# Patient Record
Sex: MALE | Race: WHITE | Employment: OTHER | URBAN - METROPOLITAN AREA
[De-identification: names, ages, dates, MRNs, and addresses within clinical notes are randomized per-mention and may not be internally consistent; named-entity substitution may affect disease eponyms.]

---

## 2017-02-08 ENCOUNTER — TELEPHONE (OUTPATIENT)
Dept: FAMILY MEDICINE CLINIC | Facility: CLINIC | Age: 37
End: 2017-02-08

## 2017-02-08 NOTE — TELEPHONE ENCOUNTER
Reason for Call/Chief Complaint: R ear pain and mild sore throat  Onset: 1 day  Nursing Assessment/Associated Symptoms: Pt c/o R ear pain and mild sore throat x 1 day. Pt declined appt because he does not have insurance.  Pt denies fever, dizziness, cough,

## 2017-02-08 NOTE — TELEPHONE ENCOUNTER
Pt called in stating his right ear is in a lot of pain, the symptoms started yesterday. Pt is requesting to speak to an RN, please advise.

## 2017-02-09 NOTE — TELEPHONE ENCOUNTER
Dr. Mauro Holter - please advise. Pt emphasized he does not want abx. He cannot come in for appt because he has no insurance. Please advise on treatment/management for ear pain. Pt agreeable to await your return to the office.

## 2017-02-09 NOTE — TELEPHONE ENCOUNTER
No answer; no VM option. If calls back, please transfer to [id number] after updating contact/work info; otherwise staff can try boris at another time.

## 2017-08-14 RX ORDER — FINASTERIDE 1 MG/1
TABLET, FILM COATED ORAL
Qty: 30 TABLET | Refills: 0 | Status: SHIPPED | OUTPATIENT
Start: 2017-08-14 | End: 2017-09-26

## 2017-09-26 ENCOUNTER — TELEPHONE (OUTPATIENT)
Dept: FAMILY MEDICINE CLINIC | Facility: CLINIC | Age: 37
End: 2017-09-26

## 2017-09-26 RX ORDER — FINASTERIDE 1 MG/1
1 TABLET, FILM COATED ORAL
Qty: 30 TABLET | Refills: 1 | Status: SHIPPED | OUTPATIENT
Start: 2017-09-26 | End: 2017-09-27

## 2017-09-26 RX ORDER — FINASTERIDE 1 MG/1
TABLET, FILM COATED ORAL
Qty: 30 TABLET | Refills: 0 | Status: CANCELLED | OUTPATIENT
Start: 2017-09-26

## 2017-09-26 NOTE — TELEPHONE ENCOUNTER
Message noted: Chart reviewed and may refill medication as requested times one with one refill. Prescription sent to listed pharmacy.  Please notify patient to make follow up appointment for further refills when he is able

## 2017-09-26 NOTE — TELEPHONE ENCOUNTER
Pt called for rx refill for Current Outpatient Prescriptions:  FINASTERIDE 1 MG Oral Tab TAKE 1 TABLET BY MOUTH DAILY Disp: 30 tablet Rfl: 0     Pt was told he needs an appt for a refill.  Pt denies creating an appointment due to insurance issues/ leaving o

## 2017-09-27 RX ORDER — FINASTERIDE 1 MG/1
1 TABLET, FILM COATED ORAL
Qty: 30 TABLET | Refills: 2 | Status: SHIPPED | OUTPATIENT
Start: 2017-09-27 | End: 2018-02-04

## 2017-09-27 NOTE — TELEPHONE ENCOUNTER
Spoke with patient who stated he won't be able to come in for three months because his new insurance won't kick in until then. Dr. Juliana Rivera do you want to add another refill? Please advise.       Signed Prescriptions Disp Refills    finasteride 1 MG Oral Tab 30

## 2017-09-27 NOTE — TELEPHONE ENCOUNTER
Signed Prescriptions Disp Refills    finasteride 1 MG Oral Tab 30 tablet 2      Sig: Take 1 tablet (1 mg total) by mouth once daily. Authorizing Provider: Raquel Whitaker        Ordering User: Albania Cannon           Refill approved per protocol.

## 2018-02-06 RX ORDER — FINASTERIDE 1 MG/1
TABLET, FILM COATED ORAL
Qty: 30 TABLET | Refills: 0 | Status: SHIPPED | OUTPATIENT
Start: 2018-02-06 | End: 2018-05-17

## 2018-02-06 NOTE — TELEPHONE ENCOUNTER
Pt states will callback to schedule an appt due to has a new job with new insurance and would like to know if  can refill just 1 month.

## 2018-02-06 NOTE — TELEPHONE ENCOUNTER
Dr. Shantel Bello, per last refill, pt was to have an appt. Please advise regarding refill as no OV since 2016. CSS, please call and assist pt in scheduling f/u appt. Thank you.

## 2018-02-07 ENCOUNTER — NURSE TRIAGE (OUTPATIENT)
Dept: OTHER | Age: 38
End: 2018-02-07

## 2018-02-07 NOTE — TELEPHONE ENCOUNTER
Called pt, verified name and . Advised him that Dr. Alfie Gaspar agrees with ER disposition. Pt is still reluctant. States that he wants to make sure he isn't just \"freaking out\".  However, also states that he doesn't remember anxiety causing his heart to hurt

## 2018-02-07 NOTE — TELEPHONE ENCOUNTER
Spoke to pt, he states that he tried to take a nap but the symptoms remain. Again, advised ER but pt states that he will not go to ER. Pt is more agreeable to go to  for eval. He will go to the [address] [place] [address] location.  RN to f/u in am.

## 2018-02-07 NOTE — TELEPHONE ENCOUNTER
Action Requested: Summary for Provider     []  Critical Lab, Recommendations Needed  [] Need Additional Advice  []   FYI    []   Need Orders  [] Need Medications Sent to Pharmacy  []  Other     SUMMARY:   I strongly suggested the ED  Patient is refusing.  H has been increasing in severity or frequency    Protocols used: CHEST PAIN-A-OH

## 2018-02-08 NOTE — TELEPHONE ENCOUNTER
Called patient - verified patient's name and  - pt states he still hasn't gone to ER/UC. States he thinks it might be anxiety but that his heart \"hurts\" which is a symptom he has not had before and also pain radiating to his left armpit.  Strongly advi

## 2018-02-09 NOTE — TELEPHONE ENCOUNTER
No ER/IC visit noted in pt's EMR. Called pt for f/u, unable to leave VM message as mailbox is full. Will try again later.

## 2018-02-10 ENCOUNTER — APPOINTMENT (OUTPATIENT)
Dept: GENERAL RADIOLOGY | Facility: HOSPITAL | Age: 38
End: 2018-02-10
Payer: COMMERCIAL

## 2018-02-10 ENCOUNTER — HOSPITAL ENCOUNTER (EMERGENCY)
Facility: HOSPITAL | Age: 38
Discharge: HOME | End: 2018-02-10
Payer: COMMERCIAL

## 2018-02-10 VITALS
DIASTOLIC BLOOD PRESSURE: 75 MMHG | RESPIRATION RATE: 18 BRPM | OXYGEN SATURATION: 99 % | TEMPERATURE: 98 F | SYSTOLIC BLOOD PRESSURE: 119 MMHG | HEART RATE: 77 BPM

## 2018-02-10 DIAGNOSIS — R07.9 CHEST PAIN OF UNCERTAIN ETIOLOGY: Primary | ICD-10-CM

## 2018-02-10 LAB
ANION GAP SERPL CALC-SCNC: 9 MMOL/L (ref 0–18)
BASOPHILS # BLD: 0 K/UL (ref 0–0.2)
BASOPHILS NFR BLD: 1 %
BUN SERPL-MCNC: 17 MG/DL (ref 8–20)
BUN/CREAT SERPL: 16.3 (ref 10–20)
CALCIUM SERPL-MCNC: 9.2 MG/DL (ref 8.5–10.5)
CHLORIDE SERPL-SCNC: 104 MMOL/L (ref 95–110)
CO2 SERPL-SCNC: 24 MMOL/L (ref 22–32)
CREAT SERPL-MCNC: 1.04 MG/DL (ref 0.5–1.5)
D DIMER PPP FEU-MCNC: <0.27 MCG/ML (ref ?–0.5)
EOSINOPHIL # BLD: 0.1 K/UL (ref 0–0.7)
EOSINOPHIL NFR BLD: 1 %
ERYTHROCYTE [DISTWIDTH] IN BLOOD BY AUTOMATED COUNT: 12.7 % (ref 11–15)
GLUCOSE SERPL-MCNC: 142 MG/DL (ref 70–99)
HCT VFR BLD AUTO: 46.4 % (ref 41–52)
HGB BLD-MCNC: 16 G/DL (ref 13.5–17.5)
LYMPHOCYTES # BLD: 1.7 K/UL (ref 1–4)
LYMPHOCYTES NFR BLD: 22 %
MAGNESIUM SERPL-MCNC: 1.9 MG/DL (ref 1.8–2.5)
MCH RBC QN AUTO: 30.7 PG (ref 27–32)
MCHC RBC AUTO-ENTMCNC: 34.5 G/DL (ref 32–37)
MCV RBC AUTO: 89 FL (ref 80–100)
MONOCYTES # BLD: 0.7 K/UL (ref 0–1)
MONOCYTES NFR BLD: 10 %
NEUTROPHILS # BLD AUTO: 5.3 K/UL (ref 1.8–7.7)
NEUTROPHILS NFR BLD: 67 %
OSMOLALITY UR CALC.SUM OF ELEC: 288 MOSM/KG (ref 275–295)
PLATELET # BLD AUTO: 199 K/UL (ref 140–400)
PMV BLD AUTO: 9.8 FL (ref 7.4–10.3)
POTASSIUM SERPL-SCNC: 3.6 MMOL/L (ref 3.3–5.1)
RBC # BLD AUTO: 5.21 M/UL (ref 4.5–5.9)
SODIUM SERPL-SCNC: 137 MMOL/L (ref 136–144)
TROPONIN I SERPL-MCNC: 0 NG/ML (ref ?–0.03)
TROPONIN I SERPL-MCNC: 0 NG/ML (ref ?–0.03)
WBC # BLD AUTO: 7.8 K/UL (ref 4–11)

## 2018-02-10 PROCEDURE — 84484 ASSAY OF TROPONIN QUANT: CPT | Performed by: EMERGENCY MEDICINE

## 2018-02-10 PROCEDURE — 83735 ASSAY OF MAGNESIUM: CPT | Performed by: EMERGENCY MEDICINE

## 2018-02-10 PROCEDURE — 93010 ELECTROCARDIOGRAM REPORT: CPT | Performed by: EMERGENCY MEDICINE

## 2018-02-10 PROCEDURE — 36415 COLL VENOUS BLD VENIPUNCTURE: CPT

## 2018-02-10 PROCEDURE — 71046 X-RAY EXAM CHEST 2 VIEWS: CPT

## 2018-02-10 PROCEDURE — 84484 ASSAY OF TROPONIN QUANT: CPT

## 2018-02-10 PROCEDURE — 80048 BASIC METABOLIC PNL TOTAL CA: CPT

## 2018-02-10 PROCEDURE — 85379 FIBRIN DEGRADATION QUANT: CPT | Performed by: EMERGENCY MEDICINE

## 2018-02-10 PROCEDURE — 93010 ELECTROCARDIOGRAM REPORT: CPT | Performed by: INTERNAL MEDICINE

## 2018-02-10 PROCEDURE — 85025 COMPLETE CBC W/AUTO DIFF WBC: CPT

## 2018-02-10 PROCEDURE — 99285 EMERGENCY DEPT VISIT HI MDM: CPT

## 2018-02-10 PROCEDURE — 93005 ELECTROCARDIOGRAM TRACING: CPT

## 2018-02-10 RX ORDER — CLONAZEPAM 0.5 MG/1
0.5 TABLET ORAL 2 TIMES DAILY PRN
Qty: 4 TABLET | Refills: 0 | Status: SHIPPED | OUTPATIENT
Start: 2018-02-10 | End: 2018-02-14

## 2018-02-10 NOTE — TELEPHONE ENCOUNTER
Pt has not checked into ER, attempted to reach pt, no answer. Unable to leave VM as mailbox is still full. OLAMIDE Ladd.

## 2018-02-10 NOTE — TELEPHONE ENCOUNTER
Spoke with pt who states he is still is having intermittent chest discomfort that radiates to his \"heart\"  Denies arm pain, nausea, difficulty breathing.   Pt states he did not want to go to the hospital since he thought it was related to his anxiety and

## 2018-02-11 NOTE — ED NOTES
PT c/o increased nausea and feeling unwell. Dr. Jake Suggs notified. Pt remains on cardiac and spo2 monitor.

## 2018-02-12 NOTE — TELEPHONE ENCOUNTER
Attempt made to contact patient at mobile number; no answer mailbox is full. Attempt made to contact patient at work number spoke with someone who states phone number is a car [employer].

## 2018-02-12 NOTE — TELEPHONE ENCOUNTER
Patient went to [address] [place] [address] ER on 2/10/18 indicated that everything came back normal. Could not find a reason for chest pain. Patient was given clonazepam for anxiety though. Patient still having chest pain and shortness of breath on and off.  Took clonazepam this

## 2018-02-14 ENCOUNTER — OFFICE VISIT (OUTPATIENT)
Dept: FAMILY MEDICINE CLINIC | Facility: CLINIC | Age: 38
End: 2018-02-14

## 2018-02-14 VITALS
HEIGHT: 69 IN | BODY MASS INDEX: 23.7 KG/M2 | SYSTOLIC BLOOD PRESSURE: 109 MMHG | WEIGHT: 160 LBS | DIASTOLIC BLOOD PRESSURE: 69 MMHG | HEART RATE: 99 BPM

## 2018-02-14 DIAGNOSIS — F41.9 ANXIETY: ICD-10-CM

## 2018-02-14 DIAGNOSIS — R07.89 ATYPICAL CHEST PAIN: ICD-10-CM

## 2018-02-14 PROCEDURE — 99213 OFFICE O/P EST LOW 20 MIN: CPT | Performed by: FAMILY MEDICINE

## 2018-02-14 PROCEDURE — 99212 OFFICE O/P EST SF 10 MIN: CPT | Performed by: FAMILY MEDICINE

## 2018-02-14 RX ORDER — CLONAZEPAM 0.5 MG/1
0.5 TABLET ORAL 2 TIMES DAILY PRN
Qty: 60 TABLET | Refills: 2 | Status: SHIPPED | OUTPATIENT
Start: 2018-02-14 | End: 2018-12-16

## 2018-02-14 NOTE — PROGRESS NOTES
HPI:    Patient ID: Juliette Abbasi is a 40year old male. Pt presents for follow up from the ER for chest pains. Was diagnosed with atypical chest pains. Work up in the ER was unremarkable.  Patient is being treated with clonazepam and states symptoms are Types: Cigarettes  Smokeless tobacco: Never Used                      Alcohol use: No                  Review of Systems     Positive for stated complaint:   Other systems are as noted in HPI.   Constitutional and vital signs reviewed.       All other syste components within normal limits  TROPONIN I - Normal  MAGNESIUM - Normal  D-DIMER - Normal  TROPONIN I - Normal  CBC WITH DIFFERENTIAL WITH PLATELET    Narrative:     The following orders were created for panel order CBC WITH DIFFERENTIAL WITH PLATELET.   P Discharge Medication List     START taking these medications     ClonazePAM 0.5 MG Oral Tab  Take 1 tablet (0.5 mg total) by mouth 2 (two) times daily as needed for Anxiety.   Qty: 4 tablet Refills: 0                   Review of Systems   Respiratory: Negat

## 2018-04-01 ENCOUNTER — TELEPHONE (OUTPATIENT)
Dept: FAMILY MEDICINE CLINIC | Facility: CLINIC | Age: 38
End: 2018-04-01

## 2018-04-01 NOTE — TELEPHONE ENCOUNTER
On call note: Was called on 4/1/18 by patient as he has sun burn on the chest. Pt is out of town. Was calling to see if there is anything that can be called in for his sun burn. Pt denies any drainage or fevers. Pt has been using otc products.  Discussed si

## 2018-05-17 RX ORDER — FINASTERIDE 1 MG/1
TABLET, FILM COATED ORAL
Qty: 30 TABLET | Refills: 0 | Status: SHIPPED | OUTPATIENT
Start: 2018-05-17 | End: 2018-06-23

## 2018-05-17 NOTE — TELEPHONE ENCOUNTER
Message noted: Chart reviewed and may refill medication as requested times one as requested. Prescription sent to listed pharmacy. Pharmacy to notify patient.

## 2018-05-17 NOTE — TELEPHONE ENCOUNTER
LOV: 2/14/18 LasT Rx: 2/6/18    No protocol     Please advise in regards to refill request. Thank You

## 2018-06-23 ENCOUNTER — TELEPHONE (OUTPATIENT)
Dept: FAMILY MEDICINE CLINIC | Facility: CLINIC | Age: 38
End: 2018-06-23

## 2018-06-24 NOTE — TELEPHONE ENCOUNTER
LOV: 2-14-18 Last Rx: 5-17-18     No protocol     Please advise in regards to refill request. Thank You

## 2018-06-25 RX ORDER — FINASTERIDE 1 MG/1
TABLET, FILM COATED ORAL
Qty: 30 TABLET | Refills: 5 | Status: SHIPPED | OUTPATIENT
Start: 2018-06-25 | End: 2019-01-18

## 2018-06-26 NOTE — TELEPHONE ENCOUNTER
Pt called in very upset that he \"gets the run around\" for a medication he's been on for 10 years. Pt states he doesn't think it's right to limit the number of refills. Expressed being upset with the process.  Pt was informed that the script was sent over

## 2018-07-24 ENCOUNTER — NURSE TRIAGE (OUTPATIENT)
Dept: OTHER | Age: 38
End: 2018-07-24

## 2018-07-24 NOTE — TELEPHONE ENCOUNTER
Chart reviewed again and do not see any patch in medication list that I or another provider prescribed for him in the last several years. What was the name of patch?

## 2018-07-24 NOTE — TELEPHONE ENCOUNTER
Chart reviewed. Do not see past visit for back pain or past prescription for any patch. I recommend he be evaluated today at immediate care.

## 2018-07-24 NOTE — TELEPHONE ENCOUNTER
Action Requested: Summary for Provider     []  Critical Lab, Recommendations Needed  [] Need Additional Advice  []   FYI    []   Need Orders  [x] Need Medications Sent to Pharmacy  []  Other     SUMMARY: Hillary Tadeo for Ramses Geronimo pt called requesting  a patch to

## 2018-07-24 NOTE — TELEPHONE ENCOUNTER
NP=please see message below, aaking for the patch that you rx him few years ago, will wait until you comes back in the office for response.     Spoke with patient (identified name and ) ,advised Cat Avila's note and very frustrated about not giv

## 2018-07-25 NOTE — TELEPHONE ENCOUNTER
Tried calling pt, call goes directly to vm option, but mailbox is full. Unable to leave message. Try later. Thanks.

## 2018-07-25 NOTE — TELEPHONE ENCOUNTER
Advised patient of Dr. Em Garsia note. Patient verbalized understanding. Patient indicated that patch was prescribed by ER, but did not state the name of the patch. Wanted to see Dr Jonny Dyer tomorrow. Appointment made for tomorrow at 2:20pm with Dr Jonny Dyer at [facility].

## 2018-07-26 ENCOUNTER — OFFICE VISIT (OUTPATIENT)
Dept: FAMILY MEDICINE CLINIC | Facility: CLINIC | Age: 38
End: 2018-07-26
Payer: COMMERCIAL

## 2018-07-26 ENCOUNTER — TELEPHONE (OUTPATIENT)
Dept: OTHER | Age: 38
End: 2018-07-26

## 2018-07-26 VITALS
HEIGHT: 69 IN | DIASTOLIC BLOOD PRESSURE: 61 MMHG | BODY MASS INDEX: 22.81 KG/M2 | RESPIRATION RATE: 18 BRPM | WEIGHT: 154 LBS | TEMPERATURE: 98 F | HEART RATE: 84 BPM | SYSTOLIC BLOOD PRESSURE: 95 MMHG

## 2018-07-26 DIAGNOSIS — M54.9 DORSALGIA: Primary | ICD-10-CM

## 2018-07-26 DIAGNOSIS — F41.9 ANXIETY: ICD-10-CM

## 2018-07-26 PROCEDURE — 99213 OFFICE O/P EST LOW 20 MIN: CPT | Performed by: FAMILY MEDICINE

## 2018-07-26 PROCEDURE — 99212 OFFICE O/P EST SF 10 MIN: CPT | Performed by: FAMILY MEDICINE

## 2018-07-26 RX ORDER — LIDOCAINE 50 MG/G
1 PATCH TOPICAL EVERY 24 HOURS
Qty: 15 PATCH | Refills: 0 | Status: SHIPPED | OUTPATIENT
Start: 2018-07-26 | End: 2021-04-08

## 2018-07-26 NOTE — TELEPHONE ENCOUNTER
Pt states he cannot wait 72 hours for the prior auth for the lidocaine patches to go through and would prefer to just get oral medication for pain. Pt expressed frustration at what he sees is a lack of timely response on our part.  Pt also states that he

## 2018-07-26 NOTE — TELEPHONE ENCOUNTER
Patient indicated that lidocaine patches are requiring a prior authorization. States that can not take pills because of his GERD. Wanted to know if could get a shot for pain? Please advise.

## 2018-07-26 NOTE — TELEPHONE ENCOUNTER
Contacted [employer] spoke with rep Lira to initiate PA for Lidocaine 5% patch. PA has been marked urgent response up to 24 hours.

## 2018-07-26 NOTE — TELEPHONE ENCOUNTER
I do not do epidural shots for back pains. Would need to see back specialist/ physiatrist. Can give # for Dr Hansen Evelia office [phone].  Will place a referral

## 2018-07-26 NOTE — PROGRESS NOTES
HPI:    Patient ID: Chaya Gatica is a 40year old male. Pt presents with some left sided back pains over the 3 days. Pt denies any injury or trauma. Pt had similar symptoms after a fall several years ago.  Pt states he prefers a pain patch due to issues w patch onto the skin daily.            Imaging & Referrals:  CHIROPRACTIC  - INTERNAL  OP REFERRAL TO PSYCHIATRY       LN#[id number]

## 2018-07-26 NOTE — TELEPHONE ENCOUNTER
Dr NP=OLAMIDE!!, patient declines physiatrist but only wants PA for the lidocaine.     Spoke with patient, advised Dr Deniz Nunn note, in the middle of giving the name and phone number of Dr Kiel Erwin,  states that he is not interested with the shot, but asking for the

## 2018-07-27 ENCOUNTER — TELEPHONE (OUTPATIENT)
Dept: FAMILY MEDICINE CLINIC | Facility: CLINIC | Age: 38
End: 2018-07-27

## 2018-07-27 NOTE — TELEPHONE ENCOUNTER
Attempted to call pt to inform him that Dr Andrei Blakely has not replied to his request - call went directly to  and mailbox was full, unable to leave msg

## 2018-07-27 NOTE — TELEPHONE ENCOUNTER
On call patient complaining because a \"grown ass man can't get his medication so could you prescribe some pain medication I waited til you doctors are done with your parties and I understand that doctors make money off prescribing medications and all the

## 2018-07-27 NOTE — TELEPHONE ENCOUNTER
See other encounter from today- patient was able to get lidocaine 4% from pharmacy. He should schedule an appointment with Dr Elvis Lopez for follow up. He noted would only like to speak with Dr. Elvis Lopez.

## 2018-07-27 NOTE — TELEPHONE ENCOUNTER
Pt states he would like to speak with with Dr. Jaimee Wayne about his experience he has had with getting the meds below and Pt states got lidocaine 4% at [employer] after 3-4 days of trying to get the meds below.      •  lidocaine 5 % External Patch, Place 1 patch

## 2018-07-27 NOTE — TELEPHONE ENCOUNTER
PA denied. Plan states patient must have pain due to post herpetic neuralgia, pain due to diabetic neuropathy or neuropathic pain due to cancer.

## 2018-07-28 ENCOUNTER — HOSPITAL ENCOUNTER (EMERGENCY)
Facility: HOSPITAL | Age: 38
Discharge: HOME | End: 2018-07-28
Attending: EMERGENCY MEDICINE

## 2018-07-28 VITALS
TEMPERATURE: 98 F | WEIGHT: 155 LBS | HEIGHT: 66 IN | OXYGEN SATURATION: 98 % | DIASTOLIC BLOOD PRESSURE: 80 MMHG | SYSTOLIC BLOOD PRESSURE: 113 MMHG | HEART RATE: 91 BPM | RESPIRATION RATE: 20 BRPM | BODY MASS INDEX: 24.91 KG/M2

## 2018-07-28 DIAGNOSIS — Z53.21 PATIENT LEFT WITHOUT BEING SEEN: Primary | ICD-10-CM

## 2018-07-28 NOTE — ED INITIAL ASSESSMENT (HPI)
Pt states Monday he felt \"a really bad episode of back pain. \" Pt states pain to left lower back and mid shoulder blades. Pt states he feels spasms. Pt using Lidocaine patches with no relied.  Pt states he has has had this pain in the past. Denies bladder/

## 2018-07-28 NOTE — TELEPHONE ENCOUNTER
Dr Arabella Tanner spoke with pt this morning. See 7/27/18 encounter. Pt is currently in ER per MD recommendations.       Nurses to f/u 7/30/18

## 2018-07-28 NOTE — TELEPHONE ENCOUNTER
Pt contacted and having no pain relief with patches and feels like something is out of place. After discussion with patient and cousin who is there to go to ER for further evaluation. Patient verbalized understanding of recommendations and agrees to plan.

## 2018-07-28 NOTE — TELEPHONE ENCOUNTER
Message noted; PA denied. Can continue otc patches for pain and follow up if not better with us or chiropractor as discussed.

## 2018-08-06 ENCOUNTER — TELEPHONE (OUTPATIENT)
Dept: OTHER | Age: 38
End: 2018-08-06

## 2018-08-06 RX ORDER — ACETAMINOPHEN AND CODEINE PHOSPHATE 300; 30 MG/1; MG/1
1 TABLET ORAL EVERY 6 HOURS PRN
Qty: 30 TABLET | Refills: 0 | Status: SHIPPED
Start: 2018-08-06 | End: 2019-01-09 | Stop reason: ALTCHOICE

## 2018-08-06 NOTE — TELEPHONE ENCOUNTER
Reviewed Dr Jeny Cardona orders with pt and given Dr Morgan Keys tel#[phone] to schedule appt. Pt verbalized understanding and agreed with md plan.

## 2018-08-06 NOTE — TELEPHONE ENCOUNTER
Went to [facility] ER for back spasms 8/3/18 found kidney stones. Did not take tramadol as makes him vomit. Has compressed disc. Given T#3 at ER given 4-5 tabs. Only takes 1/2 tab at a time.   Has 1/2 tab left and requesting T#3 refill  Using lidocaine pa

## 2018-08-06 NOTE — TELEPHONE ENCOUNTER
Message noted. May refill tylenol #3 as requested. Erx signed and will have staff here fax to listed pharmacy. To follow up for appointment if not better or can follow up with urologist for stones. Can give # for urologist- Dr Rj Dong.  Please notify patient

## 2018-12-12 ENCOUNTER — TELEPHONE (OUTPATIENT)
Dept: FAMILY MEDICINE CLINIC | Facility: CLINIC | Age: 38
End: 2018-12-12

## 2018-12-12 NOTE — TELEPHONE ENCOUNTER
Pt states NHP had prescribed clonazepam and states believes they are --states bottle is faded and states \"2018. \" States only takes PRN.  Asked pt if he called the pharmacy and was informed he has no more refills--considering the 18 Rx includ

## 2018-12-17 ENCOUNTER — TELEPHONE (OUTPATIENT)
Dept: OTHER | Age: 38
End: 2018-12-17

## 2018-12-17 DIAGNOSIS — L50.9 URTICARIA: Primary | ICD-10-CM

## 2018-12-17 RX ORDER — CLONAZEPAM 0.5 MG/1
TABLET ORAL
Qty: 60 TABLET | Refills: 0 | Status: SHIPPED
Start: 2018-12-17 | End: 2019-03-05

## 2018-12-17 NOTE — TELEPHONE ENCOUNTER
No Protocol on this med.      Requested Prescriptions     Pending Prescriptions Disp Refills   • CLONAZEPAM 0.5 MG Oral Tab [Pharmacy Med Name: CLONAZEPAM 0.5MG TABLETS] 60 tablet 0     Sig: TAKE 1 TABLET BY MOUTH 2 TIMES A DAY AS NEEDED FOR ANXIETY       L

## 2018-12-17 NOTE — TELEPHONE ENCOUNTER
While speaking to patient RE: Clonazepam refill, patient requesting NP \"to order blood tests to find out what I'm allergic to--every time I eat bread from [employer]-or any bread--I break out with welts on my face.  I also have itching and burning in my armpit

## 2018-12-17 NOTE — TELEPHONE ENCOUNTER
Referral request noted for allergy department. Referral approved, signed and sent to [facility].  Can give information for Dr Ralph Wadsworth

## 2018-12-17 NOTE — TELEPHONE ENCOUNTER
Spoke with patient and relayed NP message below--patient verbalizes understanding and agreement. Please see 12/17 referral request for further questions/concerns.     Office staff, please fax Rx as per NP

## 2019-01-08 ENCOUNTER — NURSE TRIAGE (OUTPATIENT)
Dept: OTHER | Age: 39
End: 2019-01-08

## 2019-01-08 NOTE — TELEPHONE ENCOUNTER
Relayed  message, stated he has an appt with GI, asking for medication-rleyed  message about prilosec-asked for Appt-offered but not his desirable times-stated he will wait

## 2019-01-08 NOTE — TELEPHONE ENCOUNTER
Can give # for [phone]. Can try otc prilosec for his stomach or other antacids for symptoms.  Follow up as planned with GI.

## 2019-01-08 NOTE — H&P
[address] [facility] [address] Gastroenterology                                                                                                  Clinic History and Physical     Pa today in office #151 has been consistent since July 2018 though it is a 10 pound decrease from February 2018/2016. The patient's wife reports that he \"self medicates with cannabis\" and is under a great deal of stress.   She states he eats fast food reg Cigarettes      Smokeless tobacco: Never Used    Alcohol use: No      Alcohol/week: 0.0 oz    Drug use: No       Medications (Active prior to today's visit):    Current Outpatient Medications:  Pantoprazole Sodium 40 MG Oral Tab EC Take 1 tablet (40 mg tot palpated  : no CVA tenderness  Skin: dry, warm, no jaundice  Ext: no cyanosis, clubbing or edema is evident.    Neuro: Alert and oriented x4, and patient is having movements of all 4 extremities   Psych: Pt has a normal mood and affect, behavior is normal Aisha-Parrish tear, or malignancy. Described symptoms are similar to his prior evaluation with Dr. Jerilyn Enamorado in 2006 as above though hematemesis is a new symptom. Given this, I have restarted patient on Protonix 40 mg daily preemptively.   If significant anemi colonoscopy with intervention [i.e. polypectomy, stent placement, etc.] as indicated.     EGD consent: I have discussed the risks (including risk of delayed/missed diagnosis), benefits, and alternatives to upper endoscopy/enteroscopy with the patient [who d

## 2019-01-08 NOTE — TELEPHONE ENCOUNTER
.Action Requested: Summary for Provider     []  Critical Lab, Recommendations Needed  [] Need Additional Advice  []   FYI    []   Need Orders  [] Need Medications Sent to Pharmacy  []  Other     SUMMARY:    Patient refuses the ED;   Would like Dr. Murray stahl hours    Protocols used: ABDOMINAL PAIN - MALE-A-OH

## 2019-01-09 ENCOUNTER — OFFICE VISIT (OUTPATIENT)
Dept: ALLERGY | Facility: CLINIC | Age: 39
End: 2019-01-09
Payer: COMMERCIAL

## 2019-01-09 ENCOUNTER — NURSE ONLY (OUTPATIENT)
Dept: ALLERGY | Facility: CLINIC | Age: 39
End: 2019-01-09
Payer: COMMERCIAL

## 2019-01-09 VITALS
HEIGHT: 69 IN | OXYGEN SATURATION: 98 % | HEART RATE: 102 BPM | BODY MASS INDEX: 22.22 KG/M2 | RESPIRATION RATE: 14 BRPM | WEIGHT: 150 LBS | SYSTOLIC BLOOD PRESSURE: 124 MMHG | DIASTOLIC BLOOD PRESSURE: 79 MMHG | TEMPERATURE: 98 F

## 2019-01-09 DIAGNOSIS — L30.9 DERMATITIS: Primary | ICD-10-CM

## 2019-01-09 DIAGNOSIS — Z91.018 FOOD ALLERGY: ICD-10-CM

## 2019-01-09 DIAGNOSIS — T78.1XXA ADVERSE FOOD REACTION, INITIAL ENCOUNTER: ICD-10-CM

## 2019-01-09 PROCEDURE — 95004 PERQ TESTS W/ALRGNC XTRCS: CPT | Performed by: ALLERGY & IMMUNOLOGY

## 2019-01-09 PROCEDURE — 99212 OFFICE O/P EST SF 10 MIN: CPT | Performed by: ALLERGY & IMMUNOLOGY

## 2019-01-09 PROCEDURE — 99244 OFF/OP CNSLTJ NEW/EST MOD 40: CPT | Performed by: ALLERGY & IMMUNOLOGY

## 2019-01-09 NOTE — PROGRESS NOTES
Torrey Avalos is a 41year old male. HPI:   Patient presents with:  Rash Skin Problem (integumentary): Skin burning with antipersperent. Rash Skin Problem (integumentary): Painful welts when he eats [employer] bread, and other certain breads.      Patient is External Patch Place 1 patch onto the skin daily.  (Patient taking differently: Place 1 patch onto the skin daily as needed.  ) Disp: 15 patch Rfl: 0   FINASTERIDE 1 MG Oral Tab TAKE 1 TABLET BY MOUTH ONCE DAILY Disp: 30 tablet Rfl: 5       Allergies:  No K time, place, person & situation       ASSESSMENT/PLAN:   Assessment   Dermatitis  (primary encounter diagnosis)  Food allergy  Adverse food reaction, initial encounter    Patient is a 41-year-old male with 2-year history of reported facial dermatitis that

## 2019-01-09 NOTE — PATIENT INSTRUCTIONS
Recs:  Check celiac panel and total IgA  Check CBC and CMP  Consider dermatology evaluation of facial rash that is pimple-like are pustule-like continues in the future as there may be an underlying acne component  Patient to be seen by GI in the near futur

## 2019-01-17 ENCOUNTER — TELEPHONE (OUTPATIENT)
Dept: GASTROENTEROLOGY | Facility: CLINIC | Age: 39
End: 2019-01-17

## 2019-01-17 ENCOUNTER — OFFICE VISIT (OUTPATIENT)
Dept: GASTROENTEROLOGY | Facility: CLINIC | Age: 39
End: 2019-01-17
Payer: COMMERCIAL

## 2019-01-17 VITALS
HEART RATE: 99 BPM | BODY MASS INDEX: 22.46 KG/M2 | DIASTOLIC BLOOD PRESSURE: 66 MMHG | HEIGHT: 69 IN | SYSTOLIC BLOOD PRESSURE: 102 MMHG | WEIGHT: 151.63 LBS

## 2019-01-17 DIAGNOSIS — K92.0 HEMATEMESIS, PRESENCE OF NAUSEA NOT SPECIFIED: ICD-10-CM

## 2019-01-17 DIAGNOSIS — R19.8 IRREGULAR BOWEL HABITS: Primary | ICD-10-CM

## 2019-01-17 DIAGNOSIS — K21.9 GASTROESOPHAGEAL REFLUX DISEASE, ESOPHAGITIS PRESENCE NOT SPECIFIED: ICD-10-CM

## 2019-01-17 DIAGNOSIS — R10.13 EPIGASTRIC PAIN: ICD-10-CM

## 2019-01-17 DIAGNOSIS — K92.0 HEMATEMESIS WITH NAUSEA: ICD-10-CM

## 2019-01-17 PROCEDURE — 99212 OFFICE O/P EST SF 10 MIN: CPT | Performed by: NURSE PRACTITIONER

## 2019-01-17 PROCEDURE — 99244 OFF/OP CNSLTJ NEW/EST MOD 40: CPT | Performed by: NURSE PRACTITIONER

## 2019-01-17 RX ORDER — PANTOPRAZOLE SODIUM 40 MG/1
40 TABLET, DELAYED RELEASE ORAL
Qty: 30 TABLET | Refills: 5 | Status: SHIPPED | OUTPATIENT
Start: 2019-01-17 | End: 2019-02-16

## 2019-01-17 NOTE — PATIENT INSTRUCTIONS
-Schedule colonoscopy and EGD w/ possible biopsy w/Dr. Benavides Public w/ MAC  -Prep: Split dose Colyte or equivalent  -Anti-platelets and anti-coagulants: None  -Diabetes meds: None    ** If MAC @ EM/NE:    - HOLD ACE/ARBs the night before and/or the day of the pro

## 2019-01-17 NOTE — TELEPHONE ENCOUNTER
Scheduled for:  Colonoscopy [address] [facility]  Provider Name: Dr Ploly Casas  Date:  Yadiel Renee 2/05/19  Location:  [facility]  Sedation:  MAC  Time:  2:30 pm  Prep: split dose colyte  Meds/Allergies Reconciled?:    Diagnosis with codes:  Irregular bowel habits R19.4,8, hematem

## 2019-01-19 RX ORDER — FINASTERIDE 1 MG/1
TABLET, FILM COATED ORAL
Qty: 30 TABLET | Refills: 5 | Status: SHIPPED | OUTPATIENT
Start: 2019-01-19 | End: 2019-08-03

## 2019-01-19 NOTE — TELEPHONE ENCOUNTER
No Protocol on this med.      Requested Prescriptions     Pending Prescriptions Disp Refills   • FINASTERIDE 1 MG Oral Tab [Pharmacy Med Name: FINASTERIDE 1MG TABLETS] 30 tablet 0     Sig: TAKE 1 TABLET BY MOUTH ONCE DAILY       Last Office Visit with PCP:

## 2019-01-25 ENCOUNTER — LAB ENCOUNTER (OUTPATIENT)
Dept: LAB | Facility: HOSPITAL | Age: 39
End: 2019-01-25
Attending: ALLERGY & IMMUNOLOGY
Payer: COMMERCIAL

## 2019-01-25 DIAGNOSIS — Z91.018 FOOD ALLERGY: ICD-10-CM

## 2019-01-25 DIAGNOSIS — L30.9 DERMATITIS: ICD-10-CM

## 2019-01-25 DIAGNOSIS — R19.8 IRREGULAR BOWEL HABITS: ICD-10-CM

## 2019-01-25 DIAGNOSIS — T78.1XXA ADVERSE FOOD REACTION, INITIAL ENCOUNTER: ICD-10-CM

## 2019-01-25 LAB
ALBUMIN SERPL BCP-MCNC: 4.4 G/DL (ref 3.5–4.8)
ALBUMIN/GLOB SERPL: 1.7 {RATIO} (ref 1–2)
ALP SERPL-CCNC: 62 U/L (ref 32–100)
ALT SERPL-CCNC: 38 U/L (ref 17–63)
ANION GAP SERPL CALC-SCNC: 13 MMOL/L (ref 0–18)
AST SERPL-CCNC: 19 U/L (ref 15–41)
BASOPHILS # BLD: 0 K/UL (ref 0–0.2)
BASOPHILS NFR BLD: 1 %
BILIRUB SERPL-MCNC: 0.9 MG/DL (ref 0.3–1.2)
BUN SERPL-MCNC: 12 MG/DL (ref 8–20)
BUN/CREAT SERPL: 10.6 (ref 10–20)
CALCIUM SERPL-MCNC: 9.7 MG/DL (ref 8.5–10.5)
CHLORIDE SERPL-SCNC: 102 MMOL/L (ref 95–110)
CO2 SERPL-SCNC: 25 MMOL/L (ref 22–32)
CREAT SERPL-MCNC: 1.13 MG/DL (ref 0.5–1.5)
CRP SERPL-MCNC: <0.5 MG/DL (ref 0–0.9)
EOSINOPHIL # BLD: 0 K/UL (ref 0–0.7)
EOSINOPHIL NFR BLD: 1 %
ERYTHROCYTE [DISTWIDTH] IN BLOOD BY AUTOMATED COUNT: 13 % (ref 11–15)
ERYTHROCYTE [SEDIMENTATION RATE] IN BLOOD: 6 MM/HR (ref 0–15)
GLOBULIN PLAS-MCNC: 2.6 G/DL (ref 2.5–3.7)
GLUCOSE SERPL-MCNC: 110 MG/DL (ref 70–99)
HCT VFR BLD AUTO: 48.9 % (ref 41–52)
HGB BLD-MCNC: 16.5 G/DL (ref 13.5–17.5)
IGA SERPL-MCNC: 211 MG/DL (ref 68–378)
LYMPHOCYTES # BLD: 1.3 K/UL (ref 1–4)
LYMPHOCYTES NFR BLD: 18 %
MCH RBC QN AUTO: 30.4 PG (ref 27–32)
MCHC RBC AUTO-ENTMCNC: 33.6 G/DL (ref 32–37)
MCV RBC AUTO: 90.5 FL (ref 80–100)
MONOCYTES # BLD: 0.7 K/UL (ref 0–1)
MONOCYTES NFR BLD: 9 %
NEUTROPHILS # BLD AUTO: 5.1 K/UL (ref 1.8–7.7)
NEUTROPHILS NFR BLD: 72 %
OSMOLALITY UR CALC.SUM OF ELEC: 290 MOSM/KG (ref 275–295)
PATIENT FASTING: NO
PLATELET # BLD AUTO: 204 K/UL (ref 140–400)
PMV BLD AUTO: 9.7 FL (ref 7.4–10.3)
POTASSIUM SERPL-SCNC: 4.2 MMOL/L (ref 3.3–5.1)
PROT SERPL-MCNC: 7 G/DL (ref 5.9–8.4)
RBC # BLD AUTO: 5.41 M/UL (ref 4.5–5.9)
SODIUM SERPL-SCNC: 140 MMOL/L (ref 136–144)
WBC # BLD AUTO: 7.2 K/UL (ref 4–11)

## 2019-01-25 PROCEDURE — 83516 IMMUNOASSAY NONANTIBODY: CPT

## 2019-01-25 PROCEDURE — 85652 RBC SED RATE AUTOMATED: CPT

## 2019-01-25 PROCEDURE — 86140 C-REACTIVE PROTEIN: CPT

## 2019-01-25 PROCEDURE — 85025 COMPLETE CBC W/AUTO DIFF WBC: CPT

## 2019-01-25 PROCEDURE — 36415 COLL VENOUS BLD VENIPUNCTURE: CPT

## 2019-01-25 PROCEDURE — 80053 COMPREHEN METABOLIC PANEL: CPT

## 2019-01-25 PROCEDURE — 82784 ASSAY IGA/IGD/IGG/IGM EACH: CPT

## 2019-01-25 PROCEDURE — 86256 FLUORESCENT ANTIBODY TITER: CPT

## 2019-01-29 ENCOUNTER — TELEPHONE (OUTPATIENT)
Dept: GASTROENTEROLOGY | Facility: CLINIC | Age: 39
End: 2019-01-29

## 2019-01-29 NOTE — TELEPHONE ENCOUNTER
----- Message from PLACIDO Love sent at 1/28/2019 12:42 PM CST -----  Nursing: Please call to let the patient know that the inflammatory factors were all negative which is good news. I would proceed with the endoscopy as we previously discussed.

## 2019-01-30 LAB
TTG IGA SER-ACNC: 0.5 U/ML (ref ?–7)
TTG IGG SER-ACNC: <0.6 U/ML (ref ?–7)

## 2019-02-04 ENCOUNTER — TELEPHONE (OUTPATIENT)
Dept: GASTROENTEROLOGY | Facility: CLINIC | Age: 39
End: 2019-02-04

## 2019-02-05 ENCOUNTER — ANESTHESIA EVENT (OUTPATIENT)
Dept: ENDOSCOPY | Age: 39
End: 2019-02-05
Payer: COMMERCIAL

## 2019-02-05 ENCOUNTER — TELEPHONE (OUTPATIENT)
Dept: GASTROENTEROLOGY | Facility: CLINIC | Age: 39
End: 2019-02-05

## 2019-02-05 ENCOUNTER — HOSPITAL ENCOUNTER (OUTPATIENT)
Age: 39
Setting detail: HOSPITAL OUTPATIENT SURGERY
Discharge: HOME | End: 2019-02-05
Attending: INTERNAL MEDICINE | Admitting: INTERNAL MEDICINE
Payer: COMMERCIAL

## 2019-02-05 ENCOUNTER — ANESTHESIA (OUTPATIENT)
Dept: ENDOSCOPY | Age: 39
End: 2019-02-05
Payer: COMMERCIAL

## 2019-02-05 VITALS
HEIGHT: 68 IN | DIASTOLIC BLOOD PRESSURE: 72 MMHG | HEART RATE: 80 BPM | SYSTOLIC BLOOD PRESSURE: 107 MMHG | RESPIRATION RATE: 18 BRPM | WEIGHT: 150 LBS | OXYGEN SATURATION: 97 % | BODY MASS INDEX: 22.73 KG/M2

## 2019-02-05 DIAGNOSIS — K21.9 GASTROESOPHAGEAL REFLUX DISEASE, ESOPHAGITIS PRESENCE NOT SPECIFIED: ICD-10-CM

## 2019-02-05 DIAGNOSIS — R19.8 IRREGULAR BOWEL HABITS: ICD-10-CM

## 2019-02-05 DIAGNOSIS — R10.13 EPIGASTRIC PAIN: ICD-10-CM

## 2019-02-05 DIAGNOSIS — K92.0 HEMATEMESIS, PRESENCE OF NAUSEA NOT SPECIFIED: ICD-10-CM

## 2019-02-05 PROBLEM — K64.8 INTERNAL HEMORRHOIDS WITHOUT COMPLICATION: Status: ACTIVE | Noted: 2019-02-05

## 2019-02-05 PROBLEM — K29.70 GASTRITIS: Status: ACTIVE | Noted: 2019-02-05

## 2019-02-05 PROCEDURE — 45380 COLONOSCOPY AND BIOPSY: CPT | Performed by: INTERNAL MEDICINE

## 2019-02-05 PROCEDURE — 99070 SPECIAL SUPPLIES PHYS/QHP: CPT | Performed by: INTERNAL MEDICINE

## 2019-02-05 PROCEDURE — 88305 TISSUE EXAM BY PATHOLOGIST: CPT | Performed by: INTERNAL MEDICINE

## 2019-02-05 PROCEDURE — 43239 EGD BIOPSY SINGLE/MULTIPLE: CPT | Performed by: INTERNAL MEDICINE

## 2019-02-05 PROCEDURE — 88312 SPECIAL STAINS GROUP 1: CPT | Performed by: INTERNAL MEDICINE

## 2019-02-05 RX ORDER — NALOXONE HYDROCHLORIDE 0.4 MG/ML
80 INJECTION, SOLUTION INTRAMUSCULAR; INTRAVENOUS; SUBCUTANEOUS AS NEEDED
Status: CANCELLED | OUTPATIENT
Start: 2019-02-05 | End: 2019-02-05

## 2019-02-05 RX ORDER — LIDOCAINE HYDROCHLORIDE 10 MG/ML
INJECTION, SOLUTION EPIDURAL; INFILTRATION; INTRACAUDAL; PERINEURAL AS NEEDED
Status: DISCONTINUED | OUTPATIENT
Start: 2019-02-05 | End: 2019-02-05 | Stop reason: SURG

## 2019-02-05 RX ORDER — SODIUM CHLORIDE, SODIUM LACTATE, POTASSIUM CHLORIDE, CALCIUM CHLORIDE 600; 310; 30; 20 MG/100ML; MG/100ML; MG/100ML; MG/100ML
INJECTION, SOLUTION INTRAVENOUS CONTINUOUS
Status: DISCONTINUED | OUTPATIENT
Start: 2019-02-05 | End: 2019-02-05

## 2019-02-05 RX ORDER — SODIUM CHLORIDE, SODIUM LACTATE, POTASSIUM CHLORIDE, CALCIUM CHLORIDE 600; 310; 30; 20 MG/100ML; MG/100ML; MG/100ML; MG/100ML
INJECTION, SOLUTION INTRAVENOUS CONTINUOUS
Status: CANCELLED | OUTPATIENT
Start: 2019-02-05

## 2019-02-05 RX ADMIN — SODIUM CHLORIDE, SODIUM LACTATE, POTASSIUM CHLORIDE, CALCIUM CHLORIDE: 600; 310; 30; 20 INJECTION, SOLUTION INTRAVENOUS at 15:00:00

## 2019-02-05 RX ADMIN — LIDOCAINE HYDROCHLORIDE 50 MG: 10 INJECTION, SOLUTION EPIDURAL; INFILTRATION; INTRACAUDAL; PERINEURAL at 15:00:00

## 2019-02-05 RX ADMIN — SODIUM CHLORIDE, SODIUM LACTATE, POTASSIUM CHLORIDE, CALCIUM CHLORIDE: 600; 310; 30; 20 INJECTION, SOLUTION INTRAVENOUS at 15:52:00

## 2019-02-05 NOTE — ANESTHESIA POSTPROCEDURE EVALUATION
Patient: Ann Cronin    Procedure Summary     Date:  02/05/19 Room / Location:  [facility] hospital ENDOSCOPY 01 / [facility] ENDO    Anesthesia Start:  7266 Anesthesia Stop:  8375    Procedures:       COLONOSCOPY (N/A )      ESOPHAGOGASTRODUODENOSCOPY (EGD) (N/A ) Diagnosis

## 2019-02-05 NOTE — H&P
History & Physical Examination    Patient Name: Young Trevino  MRN: [id number]  [facility]: [id number]  YOB: 1980    Diagnosis: History of hematemesis, change in bowel habits, chronic diarrhea      Medications Prior to Admission:  FINASTERIDE 1 MG Or Gastroenterology  2/5/2019  2:50 PM

## 2019-02-05 NOTE — TELEPHONE ENCOUNTER
Patient states he is having a hard time completing his prep. Has only been able to drink half the prep. He is feeling nauseated and dizzy. Denies vomiting. Stool not completely clear.   Patient states he does not want to reschedule and will continue drinkin

## 2019-02-05 NOTE — ANESTHESIA PREPROCEDURE EVALUATION
Anesthesia PreOp Note    HPI:     Edi Dover is a 41year old male who presents for preoperative consultation requested by: Kat Thurman MD    Date of Surgery: 2/5/2019    Procedure(s):  COLONOSCOPY  ESOPHAGOGASTRODUODENOSCOPY (EGD)  Indica Spouse name: Not on file      Number of children: Not on file      Years of education: Not on file      Highest education level: Not on file    Social Needs      Financial resource strain: Not on file      Food insecurity - worry: Not on file      Food in history    GI/Hepatic/Renal    (+) GERD,     Endo/Other - negative ROS   Abdominal  - normal exam             Anesthesia Plan:   ASA:  2  Plan:   MAC  Informed Consent Plan and Risks Discussed With:  Patient  Discussed plan with:  Surgeon      I have infor

## 2019-02-05 NOTE — BRIEF OP NOTE
Pre-Operative Diagnosis: Chronic diarrhea, Irregular bowel habits, Hematemesis, presence of nausea not specified, Gastroesophageal reflux disease, esophagitis presence not specified, Epigastric pain     Post-Operative Diagnosis: Rule out microscopic coliti

## 2019-02-05 NOTE — TELEPHONE ENCOUNTER
I spoke to the pt over the phone    He states he is having a hard time drinking his prep    He is very nauseated but has not vomited    He drank about 30 % of his prep last night and he is trying to get the rest down this morning    He is passing liquid st

## 2019-02-06 NOTE — OPERATIVE REPORT
[facility]    PATIENT'S NAME: Rachel Crawford   ATTENDING PHYSICIAN: Manny Melendez MD   OPERATING PHYSICIAN: Christy Camacho MD   PATIENT ACCOUNT#:   [id number]    LOCATION:  [address],[facility] ENDO POOL ROOMS 1 [facility]  MEDICAL RECORD #:   [id number] Internal hemorrhoids. RECOMMENDATION:    1. Check pathology results. 2.   See EGD to follow.     Dictated By Sal Wang MD  d: 02/05/2019 15:19:35  t: 02/05/2019 18:09:49  [facility] [id number]  ZDA/

## 2019-02-06 NOTE — OPERATIVE REPORT
[facility]    PATIENT'S NAME: Morenita Chamorro   ATTENDING PHYSICIAN: Jasson Underwood MD   OPERATING PHYSICIAN: Zulema Yañez MD   PATIENT ACCOUNT#:   [id number]    LOCATION:  [address]  MEDICAL RECORD #:   [id number] esophagogastric junction and the diaphragmatic impression were also at 40 cm. 3.   The stomach was entered and viewed in its entirety. There was minimal to mild gastric erythema throughout the gastric antrum and body.   Biopsies x4 were taken of the gastr

## 2019-02-07 ENCOUNTER — TELEPHONE (OUTPATIENT)
Dept: GASTROENTEROLOGY | Facility: CLINIC | Age: 39
End: 2019-02-07

## 2019-02-07 NOTE — TELEPHONE ENCOUNTER
Entered into Epic:Recall colon at age 49 years per Dr. Manuel Prieto. Last Colon done 2/5/19, next due 11/18/31. Snapshot updated. Letter mailed.

## 2019-02-11 ENCOUNTER — TELEPHONE (OUTPATIENT)
Dept: GASTROENTEROLOGY | Facility: CLINIC | Age: 39
End: 2019-02-11

## 2019-02-11 NOTE — TELEPHONE ENCOUNTER
I spoke to the pt over the phone and I went over the letter with him    I advised the next step is to take his pantoprazole and complete his stool testing. He agrees with the plan.     Letter printed and mailed to the pt

## 2019-02-11 NOTE — TELEPHONE ENCOUNTER
Pt calling for results from CLN/EDG, pls call at [phone], thanks.   *pt indicates he still has all the same symtoms

## 2019-03-06 RX ORDER — CLONAZEPAM 0.5 MG/1
TABLET ORAL
Qty: 60 TABLET | Refills: 0 | Status: SHIPPED
Start: 2019-03-06 | End: 2019-08-21

## 2019-03-06 NOTE — TELEPHONE ENCOUNTER
Faxed Clonazepam prescription to [facility] in [facility]. Fax was successful.

## 2019-05-11 ENCOUNTER — TELEPHONE (OUTPATIENT)
Dept: ALLERGY | Facility: CLINIC | Age: 39
End: 2019-05-11

## 2019-05-11 NOTE — TELEPHONE ENCOUNTER
Labs from 1/9/2019 have not been completed. Letter sent home. Postponed x 2 month. Dr. Thuy Sharpe, if labs have not been completed in that time okay to cancel?

## 2019-08-05 NOTE — TELEPHONE ENCOUNTER
CSS, please contact patient and assist in scheduling overdue f/u or Px as has not been seen in over a year (LOV= 7/26/18).

## 2019-08-07 RX ORDER — FINASTERIDE 1 MG/1
TABLET, FILM COATED ORAL
Qty: 90 TABLET | Refills: 1 | Status: SHIPPED | OUTPATIENT
Start: 2019-08-07 | End: 2020-01-31

## 2019-08-07 NOTE — TELEPHONE ENCOUNTER
Review pended refill request as it does not fall under a protocol.     Requested Prescriptions     Pending Prescriptions Disp Refills   • FINASTERIDE 1 MG Oral Tab [Pharmacy Med Name: FINASTERIDE 1MG TABLETS] 90 tablet 1     Sig: TAKE 1 TABLET BY MOUTH ONCE

## 2019-08-07 NOTE — TELEPHONE ENCOUNTER
Spoke with Pt made appt for 8/13 for Px. Pt said he is out the medication and can't miss the tablet. Pt said he wants to know if he is going to get  Money back from medication he missed.

## 2019-08-21 ENCOUNTER — OFFICE VISIT (OUTPATIENT)
Dept: FAMILY MEDICINE CLINIC | Facility: CLINIC | Age: 39
End: 2019-08-21
Payer: COMMERCIAL

## 2019-08-21 ENCOUNTER — TELEPHONE (OUTPATIENT)
Dept: FAMILY MEDICINE CLINIC | Facility: CLINIC | Age: 39
End: 2019-08-21

## 2019-08-21 VITALS
WEIGHT: 145 LBS | TEMPERATURE: 99 F | RESPIRATION RATE: 20 BRPM | DIASTOLIC BLOOD PRESSURE: 61 MMHG | BODY MASS INDEX: 21.48 KG/M2 | HEART RATE: 106 BPM | SYSTOLIC BLOOD PRESSURE: 95 MMHG | HEIGHT: 69 IN

## 2019-08-21 DIAGNOSIS — N52.9 ERECTILE DYSFUNCTION, UNSPECIFIED ERECTILE DYSFUNCTION TYPE: ICD-10-CM

## 2019-08-21 DIAGNOSIS — R63.4 WEIGHT LOSS: ICD-10-CM

## 2019-08-21 PROCEDURE — 99213 OFFICE O/P EST LOW 20 MIN: CPT | Performed by: FAMILY MEDICINE

## 2019-08-21 RX ORDER — SILDENAFIL 50 MG/1
50 TABLET, FILM COATED ORAL
Qty: 10 TABLET | Refills: 2 | Status: SHIPPED | OUTPATIENT
Start: 2019-08-21 | End: 2019-09-20

## 2019-08-21 RX ORDER — CLONAZEPAM 0.5 MG/1
TABLET ORAL
Qty: 60 TABLET | Refills: 0 | Status: SHIPPED | OUTPATIENT
Start: 2019-08-21 | End: 2021-03-11

## 2019-08-21 NOTE — PROGRESS NOTES
HPI:    Patient ID: Yannick Jay is a 41year old male. Pt presents with hx of some ED and male issues after starting fluoxetine as prescribed by his specialist. Pt states his depression is much better controlled.  Pt has had erectile difficulties and als no distension. There is no tenderness. There is no rebound. Psychiatric: He has a normal mood and affect. His behavior is normal. Judgment and thought content normal.   Vitals reviewed.              ASSESSMENT/PLAN:   Erectile dysfunction, unspecified ere

## 2019-08-22 NOTE — TELEPHONE ENCOUNTER
Prior authorization for Sildenafil Citrate 50MG oral tab completed w/ [employer] on cover my meds Key: [id number], turn around time 3-5 days.

## 2019-09-24 ENCOUNTER — TELEPHONE (OUTPATIENT)
Dept: FAMILY MEDICINE CLINIC | Facility: CLINIC | Age: 39
End: 2019-09-24

## 2019-09-24 NOTE — TELEPHONE ENCOUNTER
Patient needs a note stating he has an appointment with Dr. Arabella Tanner tomorrow at 11:30 to be faxed to him at [phone]. Patent needs to provide this to his job. Please Advise.

## 2019-09-25 ENCOUNTER — OFFICE VISIT (OUTPATIENT)
Dept: FAMILY MEDICINE CLINIC | Facility: CLINIC | Age: 39
End: 2019-09-25
Payer: COMMERCIAL

## 2019-09-25 VITALS
BODY MASS INDEX: 21.62 KG/M2 | SYSTOLIC BLOOD PRESSURE: 96 MMHG | DIASTOLIC BLOOD PRESSURE: 60 MMHG | HEIGHT: 69 IN | RESPIRATION RATE: 18 BRPM | TEMPERATURE: 98 F | HEART RATE: 76 BPM | WEIGHT: 146 LBS

## 2019-09-25 DIAGNOSIS — L02.415 CELLULITIS AND ABSCESS OF RIGHT LEG: ICD-10-CM

## 2019-09-25 DIAGNOSIS — L03.115 CELLULITIS AND ABSCESS OF RIGHT LEG: ICD-10-CM

## 2019-09-25 PROCEDURE — 99213 OFFICE O/P EST LOW 20 MIN: CPT | Performed by: FAMILY MEDICINE

## 2019-09-25 RX ORDER — SULFAMETHOXAZOLE AND TRIMETHOPRIM 800; 160 MG/1; MG/1
TABLET ORAL
Refills: 0 | COMMUNITY
Start: 2019-09-16 | End: 2019-09-25

## 2019-09-25 RX ORDER — SULFAMETHOXAZOLE AND TRIMETHOPRIM 800; 160 MG/1; MG/1
TABLET ORAL
Qty: 8 TABLET | Refills: 0 | Status: SHIPPED | OUTPATIENT
Start: 2019-09-25 | End: 2021-04-08

## 2019-09-25 RX ORDER — ACETAMINOPHEN AND CODEINE PHOSPHATE 300; 30 MG/1; MG/1
1 TABLET ORAL EVERY 6 HOURS PRN
Qty: 30 TABLET | Refills: 0 | Status: SHIPPED | OUTPATIENT
Start: 2019-09-25 | End: 2019-11-15

## 2019-09-25 NOTE — PROGRESS NOTES
HPI:    Patient ID: Young Trevino is a 41year old male. Pt presents follow up from the urgent care. Pt was seen multiple times after an insect bite that led to cellulitis and abscess. Pt had this incised and drainage.  Pt states the swelling and redness i Refills   • Sulfamethoxazole-TMP -160 MG Oral Tab per tablet 8 tablet 0     Sig: TK 1 T PO BID   • Acetaminophen-Codeine #3 300-30 MG Oral Tab 30 tablet 0     Sig: Take 1 tablet by mouth every 6 (six) hours as needed for Pain. May causes sedation.  No

## 2019-10-04 ENCOUNTER — NURSE TRIAGE (OUTPATIENT)
Dept: FAMILY MEDICINE CLINIC | Facility: CLINIC | Age: 39
End: 2019-10-04

## 2019-10-04 NOTE — TELEPHONE ENCOUNTER
Called patient's phone number and was informed mailbox is full. Called patient's phone number and mailbox is still full. Patient not available on mychart.

## 2019-10-04 NOTE — TELEPHONE ENCOUNTER
Action Requested: Summary for Provider     []  Critical Lab, Recommendations Needed  [x] Need Additional Advice  []   FYI    []   Need Orders  [] Need Medications Sent to Pharmacy  []  Other     SUMMARY: Patient scheduled appt for [facility] 10/7/19 9:30am with CORNELL

## 2019-10-04 NOTE — TELEPHONE ENCOUNTER
Message noted. Continue with antibiotics treatment. Continue with tylenol extra strength. Follow-up with Dr. Tara Aguilar on 10/7. Do outstanding labs after he finishes the antibiotics.  Go to UC/ER if migraine headaches are not being controlled with extra strength

## 2019-10-05 NOTE — TELEPHONE ENCOUNTER
Pt contacted and discussed headaches which improve w/ tylenol. Pt states he has been urinating more. Completed abx. After discussion, encouraged pt do blood work that was ordered for DM evaluation. Can go to ER / urgent care if sig symptoms.  Follow up and

## 2019-10-05 NOTE — TELEPHONE ENCOUNTER
Pt returning call. Tried to inform pt regarding DIANNE Dover's message below. Unable to completely relay the message. Pt was talking inappropriately, swearing and yelling at this Triage RN.  He states he is not taking any medication and don't need to listen to al

## 2019-10-06 ENCOUNTER — APPOINTMENT (OUTPATIENT)
Dept: LAB | Facility: HOSPITAL | Age: 39
End: 2019-10-06
Attending: FAMILY MEDICINE
Payer: COMMERCIAL

## 2019-10-06 DIAGNOSIS — R63.4 WEIGHT LOSS: ICD-10-CM

## 2019-10-06 PROCEDURE — 83036 HEMOGLOBIN GLYCOSYLATED A1C: CPT

## 2019-10-06 PROCEDURE — 36415 COLL VENOUS BLD VENIPUNCTURE: CPT

## 2019-10-06 PROCEDURE — 80048 BASIC METABOLIC PNL TOTAL CA: CPT

## 2019-10-06 PROCEDURE — 84443 ASSAY THYROID STIM HORMONE: CPT

## 2019-10-07 NOTE — TELEPHONE ENCOUNTER
Patient called stating he received a call but no message was left. Relayed to patient that last message in his chart was regarding his completed lab work and his appointment.  Patient states he forgot about his appointment for today due to his symptoms for

## 2019-10-07 NOTE — TELEPHONE ENCOUNTER
Labs and testings unremarkable: Patient contacted and testing discussed; To monitor symptoms and call if worse or not better; Follow up as needed.   Discussed further testing and evaluation and pt would like to hold as headaches seem controlled with otc tyl

## 2019-11-16 RX ORDER — ACETAMINOPHEN AND CODEINE PHOSPHATE 300; 30 MG/1; MG/1
1 TABLET ORAL EVERY 6 HOURS PRN
Qty: 30 TABLET | Refills: 0 | Status: SHIPPED | OUTPATIENT
Start: 2019-11-16 | End: 2020-03-08

## 2019-11-16 NOTE — TELEPHONE ENCOUNTER
Message noted: Chart reviewed and may refill tylenol #3 as requested times one. Script sent to listed pharmacy by secure method. Please notify [place] No concerns noted.

## 2020-01-31 RX ORDER — FINASTERIDE 1 MG/1
TABLET, FILM COATED ORAL
Qty: 90 TABLET | Refills: 1 | Status: SHIPPED | OUTPATIENT
Start: 2020-01-31 | End: 2020-08-11

## 2020-02-03 ENCOUNTER — TELEPHONE (OUTPATIENT)
Dept: FAMILY MEDICINE CLINIC | Facility: CLINIC | Age: 40
End: 2020-02-03

## 2020-02-03 NOTE — TELEPHONE ENCOUNTER
Prior authorization for Finasteride oral tab completed w/ [employer]  on cover my meds [place]: Siena Frank, turn around time 3-5 days.

## 2020-02-07 NOTE — TELEPHONE ENCOUNTER
Per CMM :Denied on February 5  Your request has been denied    Contacted [employer] to re-fax initial denial since never received.  Spoke with Venita from [employer], will await fax denial.

## 2020-02-07 NOTE — TELEPHONE ENCOUNTER
PA denied. The medication requested is a plan exclusion (not a covered medication ) under the patient's pharmacy benefit plan. No preferred alternatives listed.

## 2020-03-08 NOTE — TELEPHONE ENCOUNTER
Review pended refill request as it does not fall under a protocol.     Last Rx: 11/16/19  LOV: 5 months ago

## 2020-03-09 RX ORDER — ACETAMINOPHEN AND CODEINE PHOSPHATE 300; 30 MG/1; MG/1
TABLET ORAL
Qty: 30 TABLET | Refills: 0 | Status: SHIPPED | OUTPATIENT
Start: 2020-03-09 | End: 2020-04-10

## 2020-03-09 NOTE — TELEPHONE ENCOUNTER
Message noted: Chart reviewed and may refill medication as requested times one. Script sent to listed pharmacy by secure method. Please notify [place] No concerns noted.

## 2020-04-10 RX ORDER — ACETAMINOPHEN AND CODEINE PHOSPHATE 300; 30 MG/1; MG/1
TABLET ORAL
Qty: 30 TABLET | Refills: 0 | Status: SHIPPED | OUTPATIENT
Start: 2020-04-10 | End: 2020-05-13

## 2020-05-13 RX ORDER — ACETAMINOPHEN AND CODEINE PHOSPHATE 300; 30 MG/1; MG/1
TABLET ORAL
Qty: 30 TABLET | Refills: 0 | Status: SHIPPED | OUTPATIENT
Start: 2020-05-13 | End: 2020-09-03

## 2020-05-13 NOTE — TELEPHONE ENCOUNTER
Message noted: Chart reviewed and may refill tylenol #3 as requested. Script sent to listed pharmacy by secure method.     Please notify patient

## 2020-08-11 RX ORDER — FINASTERIDE 1 MG/1
TABLET, FILM COATED ORAL
Qty: 90 TABLET | Refills: 1 | Status: SHIPPED | OUTPATIENT
Start: 2020-08-11 | End: 2021-03-18

## 2020-09-03 ENCOUNTER — TELEPHONE (OUTPATIENT)
Dept: FAMILY MEDICINE CLINIC | Facility: CLINIC | Age: 40
End: 2020-09-03

## 2020-09-03 RX ORDER — ACETAMINOPHEN AND CODEINE PHOSPHATE 300; 30 MG/1; MG/1
1 TABLET ORAL EVERY 6 HOURS PRN
Qty: 30 TABLET | Refills: 0 | Status: SHIPPED | OUTPATIENT
Start: 2020-09-03 | End: 2020-11-14

## 2020-09-03 NOTE — TELEPHONE ENCOUNTER
Message noted: Chart reviewed and may refill tylenol #3 as requested. Script sent to listed pharmacy by secure method.     Pt notified through [address]

## 2020-11-14 RX ORDER — ACETAMINOPHEN AND CODEINE PHOSPHATE 300; 30 MG/1; MG/1
1 TABLET ORAL EVERY 6 HOURS PRN
Qty: 30 TABLET | Refills: 0 | Status: SHIPPED | OUTPATIENT
Start: 2020-11-14 | End: 2021-04-08

## 2020-11-14 NOTE — TELEPHONE ENCOUNTER
Action Requested: Summary for Provider     []  Critical Lab, Recommendations Needed  [] Need Additional Advice  []   FYI    []   Need Orders  [] Need Medications Sent to Pharmacy  []  Other     SUMMARY: pt seeking recommendations for back pain    Pt states

## 2020-11-14 NOTE — TELEPHONE ENCOUNTER
Message noted: Chart reviewed and may refill medication as requested. Script sent to listed pharmacy by secure method. Please notify pt.

## 2021-03-10 NOTE — TELEPHONE ENCOUNTER
Patient is requesting a refill for medication:    clonazePAM 0.5 MG Oral Tab    Please send to Kathleen.

## 2021-03-11 RX ORDER — CLONAZEPAM 0.5 MG/1
0.5 TABLET ORAL 2 TIMES DAILY PRN
Qty: 60 TABLET | Refills: 0 | Status: SHIPPED | OUTPATIENT
Start: 2021-03-11

## 2021-03-11 NOTE — TELEPHONE ENCOUNTER
Message noted: Chart reviewed and may refill medication as requested. Script sent to listed pharmacy by secure method.     Please notify pt

## 2021-03-12 NOTE — TELEPHONE ENCOUNTER
Patient notified that his Rx is at his Pharmacy. Boni Underwood (MD)  Surgery  [address], [address]  [place] [address]  Phone: [phone]  Fax: [phone]  Follow Up Time: 2 weeks

## 2021-03-18 RX ORDER — FINASTERIDE 1 MG/1
1 TABLET, FILM COATED ORAL DAILY
Qty: 90 TABLET | Refills: 0 | Status: SHIPPED | OUTPATIENT
Start: 2021-03-18 | End: 2021-04-08

## 2021-04-06 ENCOUNTER — NURSE TRIAGE (OUTPATIENT)
Dept: FAMILY MEDICINE CLINIC | Facility: CLINIC | Age: 41
End: 2021-04-06

## 2021-04-06 NOTE — TELEPHONE ENCOUNTER
Message noted. Agree with triage advice given. Stop deodorant. Avoid hot baths/ showers. No other recommendations.

## 2021-04-06 NOTE — TELEPHONE ENCOUNTER
Action Requested: Summary for Provider     []  Critical Lab, Recommendations Needed  [x] Need Additional Advice  [x]   FYI    []   Need Orders  [] Need Medications Sent to Pharmacy  []  Other     SUMMARY: For the last few weeks patient stated that Selam Matos

## 2021-04-08 ENCOUNTER — OFFICE VISIT (OUTPATIENT)
Dept: FAMILY MEDICINE CLINIC | Facility: CLINIC | Age: 41
End: 2021-04-08
Payer: COMMERCIAL

## 2021-04-08 VITALS
HEIGHT: 69 IN | SYSTOLIC BLOOD PRESSURE: 105 MMHG | DIASTOLIC BLOOD PRESSURE: 65 MMHG | HEART RATE: 87 BPM | RESPIRATION RATE: 18 BRPM | BODY MASS INDEX: 24.14 KG/M2 | TEMPERATURE: 98 F | WEIGHT: 163 LBS

## 2021-04-08 DIAGNOSIS — L30.9 DERMATITIS: ICD-10-CM

## 2021-04-08 PROCEDURE — 3078F DIAST BP <80 MM HG: CPT | Performed by: FAMILY MEDICINE

## 2021-04-08 PROCEDURE — 99213 OFFICE O/P EST LOW 20 MIN: CPT | Performed by: FAMILY MEDICINE

## 2021-04-08 PROCEDURE — 3008F BODY MASS INDEX DOCD: CPT | Performed by: FAMILY MEDICINE

## 2021-04-08 PROCEDURE — 3074F SYST BP LT 130 MM HG: CPT | Performed by: FAMILY MEDICINE

## 2021-04-08 RX ORDER — METHYLPREDNISOLONE 4 MG/1
TABLET ORAL
Qty: 1 KIT | Refills: 0 | Status: SHIPPED | OUTPATIENT
Start: 2021-04-08 | End: 2021-04-20

## 2021-04-08 NOTE — PROGRESS NOTES
HPI/Subjective:   Patient ID: Oscar Christina is a 45year old male. Pt presents with an itchy rash on the axillary area for the last 2 weeks. No new topical agents except using a new and improved deodorant. Pt has tried otc remedies with some relief.  No fe

## 2021-04-19 ENCOUNTER — TELEPHONE (OUTPATIENT)
Dept: FAMILY MEDICINE CLINIC | Facility: CLINIC | Age: 41
End: 2021-04-19

## 2021-04-20 ENCOUNTER — OFFICE VISIT (OUTPATIENT)
Dept: FAMILY MEDICINE CLINIC | Facility: CLINIC | Age: 41
End: 2021-04-20
Payer: COMMERCIAL

## 2021-04-20 VITALS
TEMPERATURE: 98 F | HEIGHT: 69 IN | DIASTOLIC BLOOD PRESSURE: 71 MMHG | HEART RATE: 85 BPM | WEIGHT: 163 LBS | BODY MASS INDEX: 24.14 KG/M2 | SYSTOLIC BLOOD PRESSURE: 112 MMHG | RESPIRATION RATE: 18 BRPM

## 2021-04-20 DIAGNOSIS — N50.812 PAIN IN BOTH TESTICLES: Primary | ICD-10-CM

## 2021-04-20 DIAGNOSIS — N50.811 PAIN IN BOTH TESTICLES: Primary | ICD-10-CM

## 2021-04-20 PROCEDURE — 99213 OFFICE O/P EST LOW 20 MIN: CPT | Performed by: FAMILY MEDICINE

## 2021-04-20 PROCEDURE — 3008F BODY MASS INDEX DOCD: CPT | Performed by: FAMILY MEDICINE

## 2021-04-20 PROCEDURE — 3078F DIAST BP <80 MM HG: CPT | Performed by: FAMILY MEDICINE

## 2021-04-20 PROCEDURE — 3074F SYST BP LT 130 MM HG: CPT | Performed by: FAMILY MEDICINE

## 2021-04-20 RX ORDER — LEVOFLOXACIN 500 MG/1
500 TABLET, FILM COATED ORAL DAILY
Qty: 10 TABLET | Refills: 0 | Status: SHIPPED | OUTPATIENT
Start: 2021-04-20 | End: 2021-06-22

## 2021-04-20 NOTE — TELEPHONE ENCOUNTER
On-call note: Patient states for the last 2 days his scrotum has been hurting quite a bit. His psychiatrist started him on 40 mg of Prozac about 5 days ago.   He states he usually has to start slowly up to that dose and wonders if starting at the high dose

## 2021-04-20 NOTE — PROGRESS NOTES
HPI/Subjective:   Patient ID: Ying Correia is a 45year old male. Pt presents with pain of both testicles about 2-3 days. No fevers or trauma to area. Pt states similar to when he had epididymitis several years ago.  Pt also has been under a lot of stress 500 MG Oral Tab 10 tablet 0     Sig: Take 1 tablet (500 mg total) by mouth daily.        Imaging & Referrals:  [facility] (RLI=[id number])

## 2021-04-20 NOTE — TELEPHONE ENCOUNTER
` Pt called back, stated s/s are the same , asked if they were swollen /red, stated he did not look them, advised this was vital information, stated he was sorry but he don't look at that area , appt made

## 2021-06-22 ENCOUNTER — OFFICE VISIT (OUTPATIENT)
Dept: FAMILY MEDICINE CLINIC | Facility: CLINIC | Age: 41
End: 2021-06-22
Payer: COMMERCIAL

## 2021-06-22 VITALS
WEIGHT: 163 LBS | DIASTOLIC BLOOD PRESSURE: 60 MMHG | HEART RATE: 87 BPM | TEMPERATURE: 97 F | BODY MASS INDEX: 24.14 KG/M2 | HEIGHT: 69 IN | RESPIRATION RATE: 20 BRPM | SYSTOLIC BLOOD PRESSURE: 99 MMHG

## 2021-06-22 DIAGNOSIS — L65.9 ALOPECIA: ICD-10-CM

## 2021-06-22 DIAGNOSIS — L30.9 DERMATITIS: ICD-10-CM

## 2021-06-22 PROCEDURE — 99213 OFFICE O/P EST LOW 20 MIN: CPT | Performed by: FAMILY MEDICINE

## 2021-06-22 PROCEDURE — 3074F SYST BP LT 130 MM HG: CPT | Performed by: FAMILY MEDICINE

## 2021-06-22 PROCEDURE — 3008F BODY MASS INDEX DOCD: CPT | Performed by: FAMILY MEDICINE

## 2021-06-22 PROCEDURE — 3078F DIAST BP <80 MM HG: CPT | Performed by: FAMILY MEDICINE

## 2021-06-22 RX ORDER — FLUOXETINE HYDROCHLORIDE 40 MG/1
CAPSULE ORAL
COMMUNITY
Start: 2021-06-21

## 2021-06-22 RX ORDER — MOMETASONE FUROATE 1 MG/G
OINTMENT TOPICAL
Qty: 45 G | Refills: 0 | Status: SHIPPED | OUTPATIENT
Start: 2021-06-22 | End: 2021-08-14

## 2021-06-22 RX ORDER — FINASTERIDE 1 MG/1
TABLET, FILM COATED ORAL
Qty: 90 TABLET | Refills: 0 | OUTPATIENT
Start: 2021-06-22

## 2021-06-22 RX ORDER — METHYLPREDNISOLONE 4 MG/1
TABLET ORAL
Qty: 1 EACH | Refills: 0 | Status: SHIPPED | OUTPATIENT
Start: 2021-06-22

## 2021-06-22 RX ORDER — FINASTERIDE 1 MG/1
1 TABLET, FILM COATED ORAL DAILY
Qty: 90 TABLET | Refills: 3 | Status: SHIPPED | OUTPATIENT
Start: 2021-06-22

## 2021-06-22 NOTE — PROGRESS NOTES
HPI/Subjective:   Patient ID: Ian Massey is a 45year old male. Pt presents today with hx of itching and rash/ burning feeling of the axilla. Mostly of the right side. He has been trying various deodorants.  Pt states this started getting aggravated aga Ointment 45 g 0     Sig: Apply a small dab to the affected area of the body once per day. • finasteride 1 MG Oral Tab 90 tablet 3     Sig: Take 1 tablet (1 mg total) by mouth daily.        Imaging & Referrals:  [facility] - INTERNAL

## 2021-08-13 NOTE — TELEPHONE ENCOUNTER
Patient calling with complaint of back pain related to scoliosis. Patient states pain currently as 8/10. Patient states he thinks he was doing too much activity at work that has increased his pain.    Patient states he has taken Tylenol #3 for pain in t

## 2021-08-14 RX ORDER — MOMETASONE FUROATE 1 MG/G
OINTMENT TOPICAL
Qty: 45 G | Refills: 0 | Status: SHIPPED | OUTPATIENT
Start: 2021-08-14

## 2021-08-14 RX ORDER — ACETAMINOPHEN AND CODEINE PHOSPHATE 300; 30 MG/1; MG/1
1 TABLET ORAL EVERY 6 HOURS PRN
Qty: 30 TABLET | Refills: 0 | Status: SHIPPED | OUTPATIENT
Start: 2021-08-14 | End: 2021-12-22

## 2021-12-22 RX ORDER — ACETAMINOPHEN AND CODEINE PHOSPHATE 300; 30 MG/1; MG/1
1 TABLET ORAL EVERY 6 HOURS PRN
Qty: 30 TABLET | Refills: 0 | Status: SHIPPED | OUTPATIENT
Start: 2021-12-22

## 2021-12-22 NOTE — TELEPHONE ENCOUNTER
Patient requesting a refill on the following medication, he states to have back pain. acetaminophen-codeine #3 300-30 MG Oral Tab 30 tablet 0 8/14/2021    Sig:   Take 1 tablet by mouth every 6 (six) hours as needed for Pain.      Route:   Oral     PRN

## 2022-02-24 ENCOUNTER — TELEPHONE (OUTPATIENT)
Dept: FAMILY MEDICINE CLINIC | Facility: CLINIC | Age: 42
End: 2022-02-24

## 2022-04-01 ENCOUNTER — TELEPHONE (OUTPATIENT)
Dept: FAMILY MEDICINE CLINIC | Facility: CLINIC | Age: 42
End: 2022-04-01

## 2022-04-01 NOTE — TELEPHONE ENCOUNTER
Patient requesting work note for taking off work today for cold symptoms. Reports went to pharmacy and got rapid test, test was neg, employer now requesting a work note for time off. Requested that results for Covid test be forwarded to office, patient unable to send at this time. Advised no available appts for today, advised for the future all work notes require an appt. Reports he will try to contact his psychiatrist for work note. Advised will forward request to Dr Augustin Warren for follow up tomorrow when he returns to office, patient agreed. Please advise.

## 2022-04-02 NOTE — TELEPHONE ENCOUNTER
Message noted and letter generated as requested. Sent to patient via \[facility]\"" SERVICES. Pt notified. To go to ER or immediate care if severe symptoms or follow up in office if not better.

## 2022-04-30 NOTE — TELEPHONE ENCOUNTER
Please review refill protocol failed/ no protocol  Requested Prescriptions   Pending Prescriptions Disp Refills    CLONAZEPAM 0.5 MG Oral Tab [Pharmacy Med Name: CLONAZEPAM 0.5MG TABLETS] 60 tablet 0     Sig: TAKE 1 TABLET(0.5 MG) BY MOUTH TWICE DAILY AS NEEDED FOR ANXIETY        There is no refill protocol information for this order

## 2022-05-02 RX ORDER — CLONAZEPAM 0.5 MG/1
0.5 TABLET ORAL 2 TIMES DAILY PRN
Qty: 60 TABLET | Refills: 0 | Status: SHIPPED | OUTPATIENT
Start: 2022-05-02

## 2022-05-02 NOTE — TELEPHONE ENCOUNTER
Message noted: Chart reviewed and may refill medication as requested. Script sent to listed pharmacy by secure method.     Pt notified through [facility]

## 2022-05-11 ENCOUNTER — NURSE TRIAGE (OUTPATIENT)
Dept: FAMILY MEDICINE CLINIC | Facility: CLINIC | Age: 42
End: 2022-05-11

## 2022-05-11 RX ORDER — ACETAMINOPHEN AND CODEINE PHOSPHATE 300; 30 MG/1; MG/1
1 TABLET ORAL EVERY 6 HOURS PRN
Qty: 30 TABLET | Refills: 0 | Status: SHIPPED | OUTPATIENT
Start: 2022-05-11

## 2022-05-11 NOTE — TELEPHONE ENCOUNTER
Message noted. May refill tylenol #3 for pains for now. Erx sent to listed pharmacy. To go to ER or immediate care if not better with pain medication. Otherwise can keep appointment for follow up as scheduled.

## 2022-05-11 NOTE — TELEPHONE ENCOUNTER
Spoke with the patient,verified full name and , informed him of message and instructions below patient stated would rather have Demerol patch, but will discuss with Dr. Randa Noguera at visit.       After Tylenol # 3 may upset his stomach    Patient verbalized understanding no further questions      Future Appointments   Date Time Provider Zack Archuleta   2022  4:20 PM Brain Stephens MD [facility]

## 2022-05-12 ENCOUNTER — OFFICE VISIT (OUTPATIENT)
Dept: FAMILY MEDICINE CLINIC | Facility: CLINIC | Age: 42
End: 2022-05-12
Payer: COMMERCIAL

## 2022-05-12 VITALS — TEMPERATURE: 99 F | HEIGHT: 69 IN | BODY MASS INDEX: 23.7 KG/M2 | WEIGHT: 160 LBS

## 2022-05-12 DIAGNOSIS — M54.6 DORSALGIA OF THORACIC REGION: Primary | ICD-10-CM

## 2022-05-12 PROCEDURE — 99213 OFFICE O/P EST LOW 20 MIN: CPT | Performed by: FAMILY MEDICINE

## 2022-05-12 PROCEDURE — 3008F BODY MASS INDEX DOCD: CPT | Performed by: FAMILY MEDICINE

## 2022-07-08 RX ORDER — FINASTERIDE 1 MG/1
TABLET, FILM COATED ORAL
Qty: 90 TABLET | Refills: 3 | Status: SHIPPED | OUTPATIENT
Start: 2022-07-08

## 2022-07-08 NOTE — TELEPHONE ENCOUNTER
Message noted: Chart reviewed and may refill medication as requested. Prescription sent to listed pharmacy. Pharmacy to notify patient.  Pt notified through [facility]

## 2022-07-12 RX ORDER — ACETAMINOPHEN AND CODEINE PHOSPHATE 300; 30 MG/1; MG/1
TABLET ORAL
Qty: 30 TABLET | Refills: 0 | Status: SHIPPED | OUTPATIENT
Start: 2022-07-12

## 2022-07-12 NOTE — TELEPHONE ENCOUNTER
Message noted: Chart reviewed and may refill medication as requested. Script sent to listed pharmacy by secure method.     Pt notified through [facility]

## 2022-07-15 ENCOUNTER — NURSE TRIAGE (OUTPATIENT)
Dept: FAMILY MEDICINE CLINIC | Facility: CLINIC | Age: 42
End: 2022-07-15

## 2022-09-08 NOTE — TELEPHONE ENCOUNTER
Please review refill protocol failed/ no protocol  Requested Prescriptions   Pending Prescriptions Disp Refills    CLONAZEPAM 0.5 MG Oral Tab [Pharmacy Med Name: CLONAZEPAM 0.5MG TABLETS] 60 tablet 0     Sig: TAKE 1 TABLET(0.5 MG) BY MOUTH TWICE DAILY AS NEEDED        There is no refill protocol information for this order

## 2022-09-09 RX ORDER — CLONAZEPAM 0.5 MG/1
0.5 TABLET ORAL 2 TIMES DAILY PRN
Qty: 60 TABLET | Refills: 0 | Status: SHIPPED | OUTPATIENT
Start: 2022-09-09

## 2022-11-11 RX ORDER — CLONAZEPAM 0.5 MG/1
TABLET ORAL
Qty: 60 TABLET | Refills: 0 | Status: SHIPPED | OUTPATIENT
Start: 2022-11-11

## 2022-11-12 NOTE — TELEPHONE ENCOUNTER
Message noted: Chart reviewed and may refill medication as requested. Script sent to listed pharmacy by secure method.     Pt notified through [facility]

## 2023-01-23 ENCOUNTER — ANESTHESIA EVENT (OUTPATIENT)
Dept: SURGERY | Facility: HOSPITAL | Age: 43
End: 2023-01-23

## 2023-01-23 ENCOUNTER — TELEPHONE (OUTPATIENT)
Dept: SURGERY | Facility: CLINIC | Age: 43
End: 2023-01-23

## 2023-01-23 ENCOUNTER — HOSPITAL ENCOUNTER (INPATIENT)
Facility: HOSPITAL | Age: 43
LOS: 1 days | Discharge: HOME | End: 2023-01-23
Attending: EMERGENCY MEDICINE | Admitting: HOSPITALIST

## 2023-01-23 ENCOUNTER — APPOINTMENT (OUTPATIENT)
Dept: GENERAL RADIOLOGY | Facility: HOSPITAL | Age: 43
End: 2023-01-23
Attending: UROLOGY

## 2023-01-23 ENCOUNTER — APPOINTMENT (OUTPATIENT)
Dept: CT IMAGING | Facility: HOSPITAL | Age: 43
End: 2023-01-23
Attending: EMERGENCY MEDICINE

## 2023-01-23 ENCOUNTER — ANESTHESIA (OUTPATIENT)
Dept: SURGERY | Facility: HOSPITAL | Age: 43
End: 2023-01-23

## 2023-01-23 VITALS
SYSTOLIC BLOOD PRESSURE: 127 MMHG | DIASTOLIC BLOOD PRESSURE: 93 MMHG | HEIGHT: 69 IN | WEIGHT: 153.5 LBS | OXYGEN SATURATION: 98 % | HEART RATE: 83 BPM | TEMPERATURE: 98 F | RESPIRATION RATE: 21 BRPM | BODY MASS INDEX: 22.74 KG/M2

## 2023-01-23 DIAGNOSIS — N20.1 RIGHT URETERAL STONE: ICD-10-CM

## 2023-01-23 DIAGNOSIS — N20.1 URETERAL STONE: Primary | ICD-10-CM

## 2023-01-23 DIAGNOSIS — N20.1 RIGHT URETERAL STONE: Primary | ICD-10-CM

## 2023-01-23 LAB
ALBUMIN SERPL-MCNC: 4 G/DL (ref 3.4–5)
ALP LIVER SERPL-CCNC: 96 U/L
ALT SERPL-CCNC: 35 U/L
ANION GAP SERPL CALC-SCNC: 7 MMOL/L (ref 0–18)
AST SERPL-CCNC: 10 U/L (ref 15–37)
BASOPHILS # BLD AUTO: 0.05 X10(3) UL (ref 0–0.2)
BASOPHILS NFR BLD AUTO: 0.4 %
BILIRUB DIRECT SERPL-MCNC: 0.2 MG/DL (ref 0–0.2)
BILIRUB SERPL-MCNC: 0.9 MG/DL (ref 0.1–2)
BILIRUB UR QL: NEGATIVE
BUN BLD-MCNC: 19 MG/DL (ref 7–18)
BUN/CREAT SERPL: 15.1 (ref 10–20)
CALCIUM BLD-MCNC: 9.2 MG/DL (ref 8.5–10.1)
CHLORIDE SERPL-SCNC: 105 MMOL/L (ref 98–112)
CLARITY UR: CLEAR
CO2 SERPL-SCNC: 28 MMOL/L (ref 21–32)
COLOR UR: YELLOW
CREAT BLD-MCNC: 1.26 MG/DL
DEPRECATED RDW RBC AUTO: 38.4 FL (ref 35.1–46.3)
EOSINOPHIL # BLD AUTO: 0.08 X10(3) UL (ref 0–0.7)
EOSINOPHIL NFR BLD AUTO: 0.7 %
ERYTHROCYTE [DISTWIDTH] IN BLOOD BY AUTOMATED COUNT: 11.9 % (ref 11–15)
GFR SERPLBLD BASED ON 1.73 SQ M-ARVRAT: 73 ML/MIN/1.73M2 (ref 60–?)
GLUCOSE BLD-MCNC: 95 MG/DL (ref 70–99)
GLUCOSE UR-MCNC: NEGATIVE MG/DL
HCT VFR BLD AUTO: 45.8 %
HGB BLD-MCNC: 16 G/DL
HYALINE CASTS #/AREA URNS AUTO: PRESENT /LPF
IMM GRANULOCYTES # BLD AUTO: 0.04 X10(3) UL (ref 0–1)
IMM GRANULOCYTES NFR BLD: 0.3 %
INR BLD: 1.04 (ref 0.85–1.16)
KETONES UR-MCNC: 20 MG/DL
LEUKOCYTE ESTERASE UR QL STRIP.AUTO: NEGATIVE
LIPASE SERPL-CCNC: 100 U/L (ref 73–393)
LYMPHOCYTES # BLD AUTO: 0.94 X10(3) UL (ref 1–4)
LYMPHOCYTES NFR BLD AUTO: 8.1 %
MCH RBC QN AUTO: 30.8 PG (ref 26–34)
MCHC RBC AUTO-ENTMCNC: 34.9 G/DL (ref 31–37)
MCV RBC AUTO: 88.2 FL
MONOCYTES # BLD AUTO: 0.83 X10(3) UL (ref 0.1–1)
MONOCYTES NFR BLD AUTO: 7.2 %
NEUTROPHILS # BLD AUTO: 9.63 X10 (3) UL (ref 1.5–7.7)
NEUTROPHILS # BLD AUTO: 9.63 X10(3) UL (ref 1.5–7.7)
NEUTROPHILS NFR BLD AUTO: 83.3 %
NITRITE UR QL STRIP.AUTO: NEGATIVE
OSMOLALITY SERPL CALC.SUM OF ELEC: 292 MOSM/KG (ref 275–295)
PH UR: 6 [PH] (ref 5–8)
PLATELET # BLD AUTO: 217 10(3)UL (ref 150–450)
POTASSIUM SERPL-SCNC: 3.9 MMOL/L (ref 3.5–5.1)
PROT SERPL-MCNC: 7.3 G/DL (ref 6.4–8.2)
PROT UR-MCNC: NEGATIVE MG/DL
PROTHROMBIN TIME: 13.5 SECONDS (ref 11.6–14.8)
RBC # BLD AUTO: 5.19 X10(6)UL
SARS-COV-2 RNA RESP QL NAA+PROBE: NOT DETECTED
SODIUM SERPL-SCNC: 140 MMOL/L (ref 136–145)
SP GR UR STRIP: 1.02 (ref 1–1.03)
UROBILINOGEN UR STRIP-ACNC: <2
VIT C UR-MCNC: NEGATIVE MG/DL
WBC # BLD AUTO: 11.6 X10(3) UL (ref 4–11)

## 2023-01-23 PROCEDURE — BT1D1ZZ FLUOROSCOPY OF RIGHT KIDNEY, URETER AND BLADDER USING LOW OSMOLAR CONTRAST: ICD-10-PCS | Performed by: UROLOGY

## 2023-01-23 PROCEDURE — 99222 1ST HOSP IP/OBS MODERATE 55: CPT | Performed by: PHYSICIAN ASSISTANT

## 2023-01-23 PROCEDURE — 0T908ZX DRAINAGE OF RIGHT KIDNEY, VIA NATURAL OR ARTIFICIAL OPENING ENDOSCOPIC, DIAGNOSTIC: ICD-10-PCS | Performed by: UROLOGY

## 2023-01-23 PROCEDURE — 0T768DZ DILATION OF RIGHT URETER WITH INTRALUMINAL DEVICE, VIA NATURAL OR ARTIFICIAL OPENING ENDOSCOPIC: ICD-10-PCS | Performed by: UROLOGY

## 2023-01-23 PROCEDURE — 74176 CT ABD & PELVIS W/O CONTRAST: CPT | Performed by: EMERGENCY MEDICINE

## 2023-01-23 PROCEDURE — 99222 1ST HOSP IP/OBS MODERATE 55: CPT | Performed by: HOSPITALIST

## 2023-01-23 PROCEDURE — 52332 CYSTOSCOPY AND TREATMENT: CPT | Performed by: UROLOGY

## 2023-01-23 PROCEDURE — 0T7D8ZZ DILATION OF URETHRA, VIA NATURAL OR ARTIFICIAL OPENING ENDOSCOPIC: ICD-10-PCS | Performed by: UROLOGY

## 2023-01-23 DEVICE — URETERAL STENT
Type: IMPLANTABLE DEVICE | Site: URETER | Status: FUNCTIONAL
Brand: ASCERTA™

## 2023-01-23 RX ORDER — FLUOXETINE HYDROCHLORIDE 20 MG/1
40 CAPSULE ORAL DAILY
Status: DISCONTINUED | OUTPATIENT
Start: 2023-01-23 | End: 2023-01-23

## 2023-01-23 RX ORDER — SENNOSIDES 8.6 MG
17.2 TABLET ORAL NIGHTLY PRN
Status: DISCONTINUED | OUTPATIENT
Start: 2023-01-23 | End: 2023-01-23

## 2023-01-23 RX ORDER — BISACODYL 10 MG
10 SUPPOSITORY, RECTAL RECTAL
Status: DISCONTINUED | OUTPATIENT
Start: 2023-01-23 | End: 2023-01-23

## 2023-01-23 RX ORDER — SODIUM PHOSPHATE, DIBASIC AND SODIUM PHOSPHATE, MONOBASIC 7; 19 G/133ML; G/133ML
1 ENEMA RECTAL ONCE AS NEEDED
Status: DISCONTINUED | OUTPATIENT
Start: 2023-01-23 | End: 2023-01-23

## 2023-01-23 RX ORDER — MORPHINE SULFATE 4 MG/ML
4 INJECTION, SOLUTION INTRAMUSCULAR; INTRAVENOUS EVERY 10 MIN PRN
Status: DISCONTINUED | OUTPATIENT
Start: 2023-01-23 | End: 2023-01-23 | Stop reason: HOSPADM

## 2023-01-23 RX ORDER — ONDANSETRON 2 MG/ML
INJECTION INTRAMUSCULAR; INTRAVENOUS AS NEEDED
Status: DISCONTINUED | OUTPATIENT
Start: 2023-01-23 | End: 2023-01-23 | Stop reason: SURG

## 2023-01-23 RX ORDER — DEXAMETHASONE SODIUM PHOSPHATE 4 MG/ML
VIAL (ML) INJECTION AS NEEDED
Status: DISCONTINUED | OUTPATIENT
Start: 2023-01-23 | End: 2023-01-23 | Stop reason: SURG

## 2023-01-23 RX ORDER — ONDANSETRON 2 MG/ML
4 INJECTION INTRAMUSCULAR; INTRAVENOUS EVERY 6 HOURS PRN
Status: DISCONTINUED | OUTPATIENT
Start: 2023-01-23 | End: 2023-01-23 | Stop reason: HOSPADM

## 2023-01-23 RX ORDER — HYDROMORPHONE HYDROCHLORIDE 1 MG/ML
0.6 INJECTION, SOLUTION INTRAMUSCULAR; INTRAVENOUS; SUBCUTANEOUS EVERY 5 MIN PRN
Status: DISCONTINUED | OUTPATIENT
Start: 2023-01-23 | End: 2023-01-23 | Stop reason: HOSPADM

## 2023-01-23 RX ORDER — LORAZEPAM 2 MG/ML
0.5 INJECTION INTRAMUSCULAR EVERY 6 HOURS PRN
Status: DISCONTINUED | OUTPATIENT
Start: 2023-01-23 | End: 2023-01-23

## 2023-01-23 RX ORDER — SODIUM CHLORIDE 9 MG/ML
INJECTION, SOLUTION INTRAVENOUS CONTINUOUS
Status: DISCONTINUED | OUTPATIENT
Start: 2023-01-23 | End: 2023-01-23

## 2023-01-23 RX ORDER — NALOXONE HYDROCHLORIDE 0.4 MG/ML
80 INJECTION, SOLUTION INTRAMUSCULAR; INTRAVENOUS; SUBCUTANEOUS AS NEEDED
Status: DISCONTINUED | OUTPATIENT
Start: 2023-01-23 | End: 2023-01-23 | Stop reason: HOSPADM

## 2023-01-23 RX ORDER — MORPHINE SULFATE 4 MG/ML
4 INJECTION, SOLUTION INTRAMUSCULAR; INTRAVENOUS ONCE
Status: COMPLETED | OUTPATIENT
Start: 2023-01-23 | End: 2023-01-23

## 2023-01-23 RX ORDER — HYDROMORPHONE HYDROCHLORIDE 1 MG/ML
0.2 INJECTION, SOLUTION INTRAMUSCULAR; INTRAVENOUS; SUBCUTANEOUS EVERY 5 MIN PRN
Status: DISCONTINUED | OUTPATIENT
Start: 2023-01-23 | End: 2023-01-23 | Stop reason: HOSPADM

## 2023-01-23 RX ORDER — TAMSULOSIN HYDROCHLORIDE 0.4 MG/1
0.4 CAPSULE ORAL
Status: DISCONTINUED | OUTPATIENT
Start: 2023-01-23 | End: 2023-01-23

## 2023-01-23 RX ORDER — SULFAMETHOXAZOLE AND TRIMETHOPRIM 800; 160 MG/1; MG/1
1 TABLET ORAL 2 TIMES DAILY
Qty: 10 TABLET | Refills: 0 | Status: SHIPPED | OUTPATIENT
Start: 2023-01-23 | End: 2023-01-28

## 2023-01-23 RX ORDER — LIDOCAINE HYDROCHLORIDE 10 MG/ML
INJECTION, SOLUTION EPIDURAL; INFILTRATION; INTRACAUDAL; PERINEURAL AS NEEDED
Status: DISCONTINUED | OUTPATIENT
Start: 2023-01-23 | End: 2023-01-23 | Stop reason: SURG

## 2023-01-23 RX ORDER — MORPHINE SULFATE 4 MG/ML
2 INJECTION, SOLUTION INTRAMUSCULAR; INTRAVENOUS EVERY 10 MIN PRN
Status: DISCONTINUED | OUTPATIENT
Start: 2023-01-23 | End: 2023-01-23 | Stop reason: HOSPADM

## 2023-01-23 RX ORDER — MORPHINE SULFATE 10 MG/ML
6 INJECTION, SOLUTION INTRAMUSCULAR; INTRAVENOUS EVERY 10 MIN PRN
Status: DISCONTINUED | OUTPATIENT
Start: 2023-01-23 | End: 2023-01-23 | Stop reason: HOSPADM

## 2023-01-23 RX ORDER — POLYETHYLENE GLYCOL 3350 17 G/17G
17 POWDER, FOR SOLUTION ORAL DAILY PRN
Status: DISCONTINUED | OUTPATIENT
Start: 2023-01-23 | End: 2023-01-23

## 2023-01-23 RX ORDER — CLONAZEPAM 0.5 MG/1
0.5 TABLET ORAL 2 TIMES DAILY PRN
Status: DISCONTINUED | OUTPATIENT
Start: 2023-01-23 | End: 2023-01-23

## 2023-01-23 RX ORDER — LORAZEPAM 2 MG/ML
0.5 INJECTION INTRAMUSCULAR ONCE
Status: COMPLETED | OUTPATIENT
Start: 2023-01-23 | End: 2023-01-23

## 2023-01-23 RX ORDER — KETOROLAC TROMETHAMINE 15 MG/ML
15 INJECTION, SOLUTION INTRAMUSCULAR; INTRAVENOUS EVERY 6 HOURS PRN
Status: DISCONTINUED | OUTPATIENT
Start: 2023-01-23 | End: 2023-01-23

## 2023-01-23 RX ORDER — MORPHINE SULFATE 4 MG/ML
4 INJECTION, SOLUTION INTRAMUSCULAR; INTRAVENOUS EVERY 2 HOUR PRN
Status: DISCONTINUED | OUTPATIENT
Start: 2023-01-23 | End: 2023-01-23

## 2023-01-23 RX ORDER — ACETAMINOPHEN 325 MG/1
650 TABLET ORAL EVERY 6 HOURS PRN
Status: DISCONTINUED | OUTPATIENT
Start: 2023-01-23 | End: 2023-01-23

## 2023-01-23 RX ORDER — HYDROCODONE BITARTRATE AND ACETAMINOPHEN 5; 325 MG/1; MG/1
1 TABLET ORAL EVERY 4 HOURS PRN
OUTPATIENT
Start: 2023-01-23

## 2023-01-23 RX ORDER — FINASTERIDE 1 MG/1
1 TABLET, FILM COATED ORAL DAILY
Status: DISCONTINUED | OUTPATIENT
Start: 2023-01-23 | End: 2023-01-23

## 2023-01-23 RX ORDER — ACETAMINOPHEN 325 MG/1
650 TABLET ORAL EVERY 4 HOURS PRN
OUTPATIENT
Start: 2023-01-23

## 2023-01-23 RX ORDER — MORPHINE SULFATE 4 MG/ML
6 INJECTION, SOLUTION INTRAMUSCULAR; INTRAVENOUS ONCE
Status: COMPLETED | OUTPATIENT
Start: 2023-01-23 | End: 2023-01-23

## 2023-01-23 RX ORDER — ONDANSETRON 2 MG/ML
4 INJECTION INTRAMUSCULAR; INTRAVENOUS ONCE
Status: COMPLETED | OUTPATIENT
Start: 2023-01-23 | End: 2023-01-23

## 2023-01-23 RX ORDER — SODIUM CHLORIDE, SODIUM LACTATE, POTASSIUM CHLORIDE, CALCIUM CHLORIDE 600; 310; 30; 20 MG/100ML; MG/100ML; MG/100ML; MG/100ML
INJECTION, SOLUTION INTRAVENOUS CONTINUOUS
Status: DISCONTINUED | OUTPATIENT
Start: 2023-01-23 | End: 2023-01-23 | Stop reason: HOSPADM

## 2023-01-23 RX ORDER — NALOXONE HYDROCHLORIDE 0.4 MG/ML
0.08 INJECTION, SOLUTION INTRAMUSCULAR; INTRAVENOUS; SUBCUTANEOUS
Status: DISCONTINUED | OUTPATIENT
Start: 2023-01-23 | End: 2023-01-23

## 2023-01-23 RX ORDER — PROCHLORPERAZINE EDISYLATE 5 MG/ML
5 INJECTION INTRAMUSCULAR; INTRAVENOUS EVERY 8 HOURS PRN
Status: DISCONTINUED | OUTPATIENT
Start: 2023-01-23 | End: 2023-01-23 | Stop reason: HOSPADM

## 2023-01-23 RX ORDER — CEFAZOLIN SODIUM/WATER 2 G/20 ML
2 SYRINGE (ML) INTRAVENOUS EVERY 8 HOURS
Status: DISCONTINUED | OUTPATIENT
Start: 2023-01-23 | End: 2023-01-23

## 2023-01-23 RX ORDER — HYDROCODONE BITARTRATE AND ACETAMINOPHEN 5; 325 MG/1; MG/1
2 TABLET ORAL EVERY 4 HOURS PRN
OUTPATIENT
Start: 2023-01-23

## 2023-01-23 RX ORDER — HYDROMORPHONE HYDROCHLORIDE 1 MG/ML
0.4 INJECTION, SOLUTION INTRAMUSCULAR; INTRAVENOUS; SUBCUTANEOUS EVERY 5 MIN PRN
Status: DISCONTINUED | OUTPATIENT
Start: 2023-01-23 | End: 2023-01-23 | Stop reason: HOSPADM

## 2023-01-23 RX ORDER — MORPHINE SULFATE 2 MG/ML
2 INJECTION, SOLUTION INTRAMUSCULAR; INTRAVENOUS EVERY 2 HOUR PRN
Status: DISCONTINUED | OUTPATIENT
Start: 2023-01-23 | End: 2023-01-23

## 2023-01-23 RX ORDER — MORPHINE SULFATE 4 MG/ML
6 INJECTION, SOLUTION INTRAMUSCULAR; INTRAVENOUS EVERY 2 HOUR PRN
Status: DISCONTINUED | OUTPATIENT
Start: 2023-01-23 | End: 2023-01-23

## 2023-01-23 RX ORDER — TAMSULOSIN HYDROCHLORIDE 0.4 MG/1
0.4 CAPSULE ORAL DAILY
Qty: 30 CAPSULE | Refills: 11 | Status: SHIPPED | OUTPATIENT
Start: 2023-01-23 | End: 2023-02-22

## 2023-01-23 RX ADMIN — LIDOCAINE HYDROCHLORIDE 50 MG: 10 INJECTION, SOLUTION EPIDURAL; INFILTRATION; INTRACAUDAL; PERINEURAL at 17:35:00

## 2023-01-23 RX ADMIN — ONDANSETRON 4 MG: 2 INJECTION INTRAMUSCULAR; INTRAVENOUS at 17:35:00

## 2023-01-23 RX ADMIN — DEXAMETHASONE SODIUM PHOSPHATE 4 MG: 4 MG/ML VIAL (ML) INJECTION at 17:35:00

## 2023-01-23 RX ADMIN — SODIUM CHLORIDE: 9 INJECTION, SOLUTION INTRAVENOUS at 17:35:00

## 2023-01-23 RX ADMIN — CEFAZOLIN SODIUM/WATER 2 G: 2 G/20 ML SYRINGE (ML) INTRAVENOUS at 17:44:00

## 2023-01-23 NOTE — ED INITIAL ASSESSMENT (HPI)
Pt presents from home with c/o right sided abdominal and flank pain. Pt screaming in triage I think it is going to burst. Pt has hx of kidney stones. Pt denies changes with urination.

## 2023-01-23 NOTE — DISCHARGE INSTRUCTIONS
You should expect to have some blood in your urine, burning when you pee, urgency and frequency. This is normal. If you have a fever >101F, chills, nausea, vomiting, inability to urinate please go to the emergency room for evaluation. Pain is normal after this procedure. Try to take Tylenol ibuprofen and use hot packs. Drink as much water as possible. Additionally you can try over the counter pyridium if you notice bladder discomfort. We have also prescribed you 5 days of an antibiotic that I request that you take. We may change this antibiotic based on your culture results which takes about 3 days to result. You will need to follow up for definitive stone surgery in minimum 2 weeks. Do not hold us to this date, it may be anywhere from 2-6 weeks based on OR availability and scheduling. Stone prevention methods including hydration (until urine is clear), limiting salt and red meat/meat intake, eating a normal calcium diet, and drinking lemon with water. We also talked about reasons to go to the emergency room - namely acute flank pain associated with fevers, chills, nausea or vomiting.    Call T: [phone] if you have any questions or concerns

## 2023-01-23 NOTE — ANESTHESIA PROCEDURE NOTES
Airway  Date/Time: 1/23/2023 5:37 PM  Urgency: Elective      General Information and Staff    Patient location during procedure: OR  Anesthesiologist: Blanche Lund MD  Performed: anesthesiologist     Indications and Patient Condition  Indications for airway management: anesthesia  Sedation level: deep  Preoxygenated: yes  Patient position: sniffing  Mask difficulty assessment: 1 - vent by mask    Final Airway Details  Final airway type: supraglottic airway      Successful airway: classic  Size 4       Number of attempts at approach: 1    Additional Comments  Atx good seal

## 2023-01-23 NOTE — OPERATIVE REPORT
OPERATIVE REPORT  DATE OF SURGERY: 1/23/2023  PREOPERATIVE DIAGNOSIS: right ureteral stone  POSTOPERATIVE DIAGNOSIS: same  PROCEDURE: Cystoscopy, right retrograde pyelogram, placement of 6 x 26cm double j stent, passive dilation of ureteral stricture  SURGEON: Mila Bellamy MD  ASSISTANT: none  ANESTHESIA: general  FLUIDS: per anesthesia  URINE OUTPUT: n/a  ESTIMATED BLOOD LOSS: 3cc  SPECIMENS: right renal pelvis urine for culture  CONDITION: Stable to PACU  INDICATIONS: obstructing right ureteral stone 5mm with severe pain, leukocytosis and hot flashes, will plan for ureteral stent. Patient understands risks of bleeding, infection, damage surrounding structures (urethra, bladder, ureter, need for additional procedures, inability to place stent, need for nephrostomy tube, anesthesia complications, stent related pain, stent colic, blood in the urine. He understands that he will need a definitive ureteroscopy in minimum 2 weeks. PROCEDURE: The patient was met in the preoperative holding area. Appropriate informed consent was obtained and the patient was brought to the operative suite. ? Appropriate timeout was performed per hospital protocol. After the successful induction of general anesthesia, the patient was placed in the dorsal lithotomy position. ?Pressure points were meticulously padded. The patient was prepped and draped in a standard sterile fashion and IV Ancef was given as preoperative prophylaxis. At this point, we passed a 22-Welsh cystoscope per urethra into the bladder after dilation of a small urethral stricture in the bulbar urethra over a wire. Shantal Puff ? We identified the right ureteric orifice and used fluoroscopic guidance to cannulate this with a .038 Sensor guidewire to the level of the kidney. ? We passed an open ended catheter over the wire into the kidney. The wire was removed and we aspirated 5ml of urine from the kidney. It was cloudy.  We then replaced the wire into the kidney and removed the open ended. We passed a 6-Algerian x 26 cm stent over the wire with a coil in the right kidney and a nice coil in the bladder, the string was not left in place. The procedure was then complete. A Latif catheter was placed. He was awoken from anesthesia and taken to the recovery room in stable condition. All counts were correct x2. IMPRESSION: s/p right ureteral stent placement with urine from kidney sent for culture    PLAN:  1. Tamsulosin, 3 days bactrim prescribed  2. Follow up urine culture from OR  3. RTOR for definitive ureteroscopy in minimum 2 weeks  4. He understands that pain is normal with the stent and that there is nothing to do about the stent related pain other than is managed with hydration, take tamsulosin, take ibuprofen and tylenol, tincture of time.  If there is severe pain, may need to reimage for consideration of any stent related issues like migration etc.

## 2023-01-23 NOTE — ED QUICK NOTES
Notified floor KOBE Lu that patient is agreeable to surgery and to notify urology
Orders for admission, patient is aware of plan and ready to go upstairs. Any questions, please call ED RN Shahana Limon at extension [phone]. Patient Covid vaccination status: Unvaccinated     COVID Test Ordered in ED: Rapid SARS-CoV-2 by PCR    COVID Suspicion at Admission: N/A    Running Infusions:      Mental Status/LOC at time of transport: a. ox4    Other pertinent information:   CIWA score: N/A   NIH score:  N/A
Patient attempted urination - unsuccessful due to too much pain.
Per urology - NPO for now.
No

## 2023-01-24 ENCOUNTER — TELEPHONE (OUTPATIENT)
Dept: FAMILY MEDICINE CLINIC | Facility: CLINIC | Age: 43
End: 2023-01-24

## 2023-01-24 ENCOUNTER — TELEPHONE (OUTPATIENT)
Dept: SURGERY | Facility: CLINIC | Age: 43
End: 2023-01-24

## 2023-01-24 RX ORDER — ACETAMINOPHEN AND CODEINE PHOSPHATE 300; 30 MG/1; MG/1
1 TABLET ORAL EVERY 6 HOURS PRN
Qty: 8 TABLET | Refills: 0 | Status: SHIPPED | OUTPATIENT
Start: 2023-01-24 | End: 2023-01-24

## 2023-01-24 NOTE — TELEPHONE ENCOUNTER
David Limon,   Can you please schedule this patient for a ureteroscopy no sooner than feb 6th. Perhaps feb 14th as I am out of town the week of the 6th. He will need a repeat urine culture 7-10 days prior to his surgery. Should be booked for 1.5 hours. Thanks! [address] [place] [address]      Urology Surgery Scheduling Request    Location: [address] [place] [address] OR    Surgeon: Mitchell Bonner MD    Asst. Surgeon: N/A    Diagnosis: right ureteral stone    Procedure: cystoscopy, right ureteroscopy, laser lithotripsy, stone basketing, stent exchange, retrograde pyelogram    Anesthesia: General ETT    Time Frame: minimum 2 weeks, no sooner than Feb 6    Time needed: 1.5 hours    Special Equipment: Holmium Laser    On Call to OR: ampicillin and ceftriaxone    Admission: Day Surgery    Pre-op Testing: Urine Culture     Need Pre-op Clearance: none    Estimated Post Op/Follow Up Appt: roney Salas MD  1/23/2023

## 2023-01-24 NOTE — PROGRESS NOTES
[id number]: this writer received a very angry phone call from pt that he is \"peeing so much blood I might pass out and crack my head open. \" He states we did not give him any pain meds and that he will be suing the hospital. This writer advised pt to come to the ER with his concerns or to follow up with his urologist. Pt states \" I will come make a scene, have the police there. \" Explained to pt the discharge instructions he has and gave pt the number for the urologist team. Reinforced that because pt has been discharged, we are unable to further prescribe meds. Advised to come to ER if pt is having this much pain with increased bleeding.

## 2023-01-24 NOTE — DISCHARGE PLANNING
MDO placed for discharge. Patient chart reviewed for discharge: Medication Reconciliation completed, Specialist/PCP follow up listed, and disease specific Instructions/Education included in After Visit Summary. Discharge RN notified patient's RN of AVS completion and verified all consultants have signed off. Patient's RN to notify DC RN if discharge status changes.

## 2023-01-25 RX ORDER — ACETAMINOPHEN AND CODEINE PHOSPHATE 300; 30 MG/1; MG/1
1 TABLET ORAL EVERY 6 HOURS PRN
Qty: 22 TABLET | Refills: 0 | Status: SHIPPED | OUTPATIENT
Start: 2023-01-25

## 2023-01-25 NOTE — TELEPHONE ENCOUNTER
Patient calling with complaint of severe pain s/p cystoscopy, right ureteral stent placement, passive dilation of stricture on 1/23/23. Also complains of gross hematuria. Patient unsure if this is normal after procedure. Requesting that a small amount of Tylenol #3 be sent to pharmacy for pain control. APRN also had provider on-call service page urology to contact patient back regarding questions after procedure. Tylenol #3, 8 tabs sent to pharmacy.    Will send remaining refill to Dr. Yordan Lyn     See refill request

## 2023-01-25 NOTE — TELEPHONE ENCOUNTER
Called the call center today. S/p stent yesterday. Having some dysuria and flank pain. Voiding frequently and pain at end of stream. Having red urine. Seems he is emptying his bladder OK. Advised to increase fluid intake, take motrin/advil (with his PRN PPI since GERD issues), and can take OTC Azo for dysuria. Warning signs for ED provided including fevers, chills, inability to void.

## 2023-01-25 NOTE — TELEPHONE ENCOUNTER
Message noted: Chart reviewed and may refill medication as requested. Prescription sent to listed pharmacy. Pharmacy to notify patient.  Pt notified through [facility]

## 2023-02-17 ENCOUNTER — TELEPHONE (OUTPATIENT)
Dept: SURGERY | Facility: CLINIC | Age: 43
End: 2023-02-17

## 2023-02-17 NOTE — TELEPHONE ENCOUNTER
Per pt has questions regarding upcoming procedure. States is starting to feel more pain.  Please advise

## 2023-02-17 NOTE — TELEPHONE ENCOUNTER
Spoke with patient regarding below. PT confirmed and verbalized understanding. Encounter complete. Nini Lane MD  You 1 minute ago (11:52 AM)       David layne   Thank you again, I REALLY appreciate it.     please contact patient to let him know that the diarrhea should be discussed with his PCP as it is likely unrelated to the stent. The pain and blood in his urine is from the stent. He should stay as hydrated as possible, take tamsulosin and take ibuprofen for the pain. It will likely persist while the stent is in place and for 1-2 weeks after the surgery. He should go to the ER for fevers chills     Thanks!    AR

## 2023-02-17 NOTE — TELEPHONE ENCOUNTER
-Routed to Dr. Gus Montaño:  Please review & advise. Thank Ashley Juarez    -S/w pt; identity verified with name & .  -Pt reports having \"diarrhea (X4) that started last night. \"  -In addition, having \"discomfort on the Right side (below Navel) where the stent is. \"  -No fever; urinary frequency; no burning. -NOTE: pt surgery 3/9/23 for Cysto/Litho.  -Outcome pending.

## 2023-02-23 RX ORDER — ACETAMINOPHEN AND CODEINE PHOSPHATE 300; 30 MG/1; MG/1
TABLET ORAL
Qty: 22 TABLET | Refills: 0 | Status: SHIPPED | OUTPATIENT
Start: 2023-02-23

## 2023-02-23 NOTE — TELEPHONE ENCOUNTER
Message noted: Chart reviewed and may refill medication as requested. Script sent to listed pharmacy by secure method.     Pt notified through [facility]

## 2023-03-03 ENCOUNTER — TELEPHONE (OUTPATIENT)
Dept: SURGERY | Facility: CLINIC | Age: 43
End: 2023-03-03

## 2023-03-03 NOTE — TELEPHONE ENCOUNTER
Spoke with patient' sister Christiano Akbar who states, patient has new insurance and will bring it on his surgery.

## 2023-03-06 ENCOUNTER — LAB ENCOUNTER (OUTPATIENT)
Dept: LAB | Facility: HOSPITAL | Age: 43
End: 2023-03-06
Attending: UROLOGY
Payer: COMMERCIAL

## 2023-03-06 DIAGNOSIS — Z01.818 PRE-OP TESTING: ICD-10-CM

## 2023-03-07 LAB — SARS-COV-2 RNA RESP QL NAA+PROBE: NOT DETECTED

## 2023-03-08 NOTE — H&P
PRE-OP NOTE     NAME/MRN/PROCEDURE: Trip Rosen [id number], right ureteroscopy, laser lithotripsy, stone basketing, retrograde pyelogram  HPI: 42-year-old man with an obstructing 5 mm left ureteral stone who underwent stent placement on 1/23/2022. He scheduled his surgery for 3/8/2023.   PMH: Anxiety, GERD, chronic back pain, scoliosis  PSH: Stent placement, colonoscopy  MEDS: Clonazepam, finasteride, acetaminophen-codeine, fluoxetine  ALL: No known allergies  LABS: COVID-negative, cytology unclear reason why it was performed atypical, urine culture negative, urinalysis negative, INR 1.04  IMAGING:     OTHER: none  ABX: IV ampicillin and ceftriaxone     Physical exam:   General appearance: alert, appears stated age, cooperative and no distress  Head: Normocephalic, without obvious abnormality, atraumatic  Back: no CVAT at time of examination  Lungs: non labored respirations, on O2 via NC  Abdomen: soft,NTND  Extremities: extremities normal, atraumatic, no cyanosis or edema  Neurologic: Grossly normal  Psych appropriate affect

## 2023-03-09 ENCOUNTER — APPOINTMENT (OUTPATIENT)
Dept: GENERAL RADIOLOGY | Facility: HOSPITAL | Age: 43
End: 2023-03-09
Attending: UROLOGY
Payer: COMMERCIAL

## 2023-03-09 ENCOUNTER — ANESTHESIA EVENT (OUTPATIENT)
Dept: SURGERY | Facility: HOSPITAL | Age: 43
End: 2023-03-09
Payer: COMMERCIAL

## 2023-03-09 ENCOUNTER — TELEPHONE (OUTPATIENT)
Dept: SURGERY | Facility: CLINIC | Age: 43
End: 2023-03-09

## 2023-03-09 ENCOUNTER — ANESTHESIA (OUTPATIENT)
Dept: SURGERY | Facility: HOSPITAL | Age: 43
End: 2023-03-09
Payer: COMMERCIAL

## 2023-03-09 ENCOUNTER — HOSPITAL ENCOUNTER (OUTPATIENT)
Facility: HOSPITAL | Age: 43
Setting detail: HOSPITAL OUTPATIENT SURGERY
Discharge: HOME | End: 2023-03-09
Attending: UROLOGY | Admitting: UROLOGY
Payer: COMMERCIAL

## 2023-03-09 VITALS
DIASTOLIC BLOOD PRESSURE: 64 MMHG | SYSTOLIC BLOOD PRESSURE: 109 MMHG | HEIGHT: 69 IN | WEIGHT: 150 LBS | HEART RATE: 99 BPM | BODY MASS INDEX: 22.22 KG/M2 | RESPIRATION RATE: 16 BRPM | TEMPERATURE: 98 F | OXYGEN SATURATION: 100 %

## 2023-03-09 DIAGNOSIS — Z01.818 PRE-OP TESTING: Primary | ICD-10-CM

## 2023-03-09 DIAGNOSIS — N20.1 RIGHT URETERAL STONE: ICD-10-CM

## 2023-03-09 PROCEDURE — 0TC68ZZ EXTIRPATION OF MATTER FROM RIGHT URETER, VIA NATURAL OR ARTIFICIAL OPENING ENDOSCOPIC: ICD-10-PCS | Performed by: UROLOGY

## 2023-03-09 PROCEDURE — 52356 CYSTO/URETERO W/LITHOTRIPSY: CPT | Performed by: UROLOGY

## 2023-03-09 PROCEDURE — BT1DZZZ FLUOROSCOPY OF RIGHT KIDNEY, URETER AND BLADDER: ICD-10-PCS | Performed by: UROLOGY

## 2023-03-09 PROCEDURE — 0T768DZ DILATION OF RIGHT URETER WITH INTRALUMINAL DEVICE, VIA NATURAL OR ARTIFICIAL OPENING ENDOSCOPIC: ICD-10-PCS | Performed by: UROLOGY

## 2023-03-09 PROCEDURE — 0TC08ZZ EXTIRPATION OF MATTER FROM RIGHT KIDNEY, VIA NATURAL OR ARTIFICIAL OPENING ENDOSCOPIC: ICD-10-PCS | Performed by: UROLOGY

## 2023-03-09 DEVICE — URETERAL STENT
Type: IMPLANTABLE DEVICE | Site: URETER | Status: FUNCTIONAL
Brand: ASCERTA™

## 2023-03-09 RX ORDER — KETOROLAC TROMETHAMINE 10 MG/1
10 TABLET, FILM COATED ORAL EVERY 6 HOURS PRN
Qty: 5 TABLET | Refills: 0 | Status: SHIPPED | OUTPATIENT
Start: 2023-03-09

## 2023-03-09 RX ORDER — HYDROMORPHONE HYDROCHLORIDE 1 MG/ML
0.2 INJECTION, SOLUTION INTRAMUSCULAR; INTRAVENOUS; SUBCUTANEOUS EVERY 5 MIN PRN
Status: DISCONTINUED | OUTPATIENT
Start: 2023-03-09 | End: 2023-03-09

## 2023-03-09 RX ORDER — NEOSTIGMINE METHYLSULFATE 1 MG/ML
INJECTION, SOLUTION INTRAVENOUS AS NEEDED
Status: DISCONTINUED | OUTPATIENT
Start: 2023-03-09 | End: 2023-03-09 | Stop reason: SURG

## 2023-03-09 RX ORDER — LIDOCAINE HYDROCHLORIDE 10 MG/ML
INJECTION, SOLUTION EPIDURAL; INFILTRATION; INTRACAUDAL; PERINEURAL AS NEEDED
Status: DISCONTINUED | OUTPATIENT
Start: 2023-03-09 | End: 2023-03-09 | Stop reason: SURG

## 2023-03-09 RX ORDER — SULFAMETHOXAZOLE AND TRIMETHOPRIM 800; 160 MG/1; MG/1
1 TABLET ORAL 2 TIMES DAILY
Qty: 6 TABLET | Refills: 0 | Status: SHIPPED | OUTPATIENT
Start: 2023-03-09 | End: 2023-03-12

## 2023-03-09 RX ORDER — NALOXONE HYDROCHLORIDE 4 MG/.1ML
4 SPRAY NASAL AS NEEDED
Qty: 1 KIT | Refills: 0 | Status: SHIPPED | OUTPATIENT
Start: 2023-03-09

## 2023-03-09 RX ORDER — SODIUM CHLORIDE, SODIUM LACTATE, POTASSIUM CHLORIDE, CALCIUM CHLORIDE 600; 310; 30; 20 MG/100ML; MG/100ML; MG/100ML; MG/100ML
INJECTION, SOLUTION INTRAVENOUS CONTINUOUS
Status: DISCONTINUED | OUTPATIENT
Start: 2023-03-09 | End: 2023-03-09

## 2023-03-09 RX ORDER — NALOXONE HYDROCHLORIDE 0.4 MG/ML
80 INJECTION, SOLUTION INTRAMUSCULAR; INTRAVENOUS; SUBCUTANEOUS AS NEEDED
Status: DISCONTINUED | OUTPATIENT
Start: 2023-03-09 | End: 2023-03-09

## 2023-03-09 RX ORDER — TAMSULOSIN HYDROCHLORIDE 0.4 MG/1
0.4 CAPSULE ORAL DAILY
Qty: 30 CAPSULE | Refills: 0 | Status: SHIPPED | OUTPATIENT
Start: 2023-03-09 | End: 2023-04-08

## 2023-03-09 RX ORDER — HYDROMORPHONE HYDROCHLORIDE 2 MG/1
2 TABLET ORAL EVERY 6 HOURS PRN
Qty: 3 TABLET | Refills: 0 | Status: SHIPPED | OUTPATIENT
Start: 2023-03-09

## 2023-03-09 RX ORDER — HYDROMORPHONE HYDROCHLORIDE 4 MG/1
2 TABLET ORAL ONCE
Status: DISCONTINUED | OUTPATIENT
Start: 2023-03-09 | End: 2023-03-09

## 2023-03-09 RX ORDER — HYDROMORPHONE HYDROCHLORIDE 1 MG/ML
0.4 INJECTION, SOLUTION INTRAMUSCULAR; INTRAVENOUS; SUBCUTANEOUS EVERY 5 MIN PRN
Status: DISCONTINUED | OUTPATIENT
Start: 2023-03-09 | End: 2023-03-09

## 2023-03-09 RX ORDER — GLYCOPYRROLATE 0.2 MG/ML
INJECTION, SOLUTION INTRAMUSCULAR; INTRAVENOUS AS NEEDED
Status: DISCONTINUED | OUTPATIENT
Start: 2023-03-09 | End: 2023-03-09 | Stop reason: SURG

## 2023-03-09 RX ORDER — ONDANSETRON 2 MG/ML
INJECTION INTRAMUSCULAR; INTRAVENOUS AS NEEDED
Status: DISCONTINUED | OUTPATIENT
Start: 2023-03-09 | End: 2023-03-09 | Stop reason: SURG

## 2023-03-09 RX ORDER — LIDOCAINE HYDROCHLORIDE 20 MG/ML
JELLY TOPICAL AS NEEDED
Status: DISCONTINUED | OUTPATIENT
Start: 2023-03-09 | End: 2023-03-09 | Stop reason: HOSPADM

## 2023-03-09 RX ORDER — PROCHLORPERAZINE EDISYLATE 5 MG/ML
5 INJECTION INTRAMUSCULAR; INTRAVENOUS EVERY 8 HOURS PRN
Status: DISCONTINUED | OUTPATIENT
Start: 2023-03-09 | End: 2023-03-09

## 2023-03-09 RX ORDER — HYDROMORPHONE HYDROCHLORIDE 2 MG/1
2 TABLET ORAL ONCE
Status: COMPLETED | OUTPATIENT
Start: 2023-03-09 | End: 2023-03-09

## 2023-03-09 RX ORDER — CEFTRIAXONE 1 G/1
INJECTION, POWDER, FOR SOLUTION INTRAMUSCULAR; INTRAVENOUS AS NEEDED
Status: DISCONTINUED | OUTPATIENT
Start: 2023-03-09 | End: 2023-03-09 | Stop reason: SURG

## 2023-03-09 RX ORDER — MORPHINE SULFATE 4 MG/ML
4 INJECTION, SOLUTION INTRAMUSCULAR; INTRAVENOUS EVERY 10 MIN PRN
Status: DISCONTINUED | OUTPATIENT
Start: 2023-03-09 | End: 2023-03-09

## 2023-03-09 RX ORDER — MEPERIDINE HYDROCHLORIDE 25 MG/ML
12.5 INJECTION INTRAMUSCULAR; INTRAVENOUS; SUBCUTANEOUS AS NEEDED
Status: DISCONTINUED | OUTPATIENT
Start: 2023-03-09 | End: 2023-03-09

## 2023-03-09 RX ORDER — DEXAMETHASONE SODIUM PHOSPHATE 4 MG/ML
VIAL (ML) INJECTION AS NEEDED
Status: DISCONTINUED | OUTPATIENT
Start: 2023-03-09 | End: 2023-03-09 | Stop reason: SURG

## 2023-03-09 RX ORDER — MORPHINE SULFATE 10 MG/ML
6 INJECTION, SOLUTION INTRAMUSCULAR; INTRAVENOUS EVERY 10 MIN PRN
Status: DISCONTINUED | OUTPATIENT
Start: 2023-03-09 | End: 2023-03-09

## 2023-03-09 RX ORDER — SODIUM CHLORIDE 9 MG/ML
INJECTION, SOLUTION INTRAVENOUS CONTINUOUS
Status: DISCONTINUED | OUTPATIENT
Start: 2023-03-09 | End: 2023-03-09

## 2023-03-09 RX ORDER — ONDANSETRON 2 MG/ML
4 INJECTION INTRAMUSCULAR; INTRAVENOUS EVERY 6 HOURS PRN
Status: DISCONTINUED | OUTPATIENT
Start: 2023-03-09 | End: 2023-03-09

## 2023-03-09 RX ORDER — HYDROMORPHONE HYDROCHLORIDE 1 MG/ML
0.6 INJECTION, SOLUTION INTRAMUSCULAR; INTRAVENOUS; SUBCUTANEOUS EVERY 5 MIN PRN
Status: DISCONTINUED | OUTPATIENT
Start: 2023-03-09 | End: 2023-03-09

## 2023-03-09 RX ORDER — MORPHINE SULFATE 4 MG/ML
2 INJECTION, SOLUTION INTRAMUSCULAR; INTRAVENOUS EVERY 10 MIN PRN
Status: DISCONTINUED | OUTPATIENT
Start: 2023-03-09 | End: 2023-03-09

## 2023-03-09 RX ORDER — ACETAMINOPHEN 500 MG
1000 TABLET ORAL ONCE
Status: DISCONTINUED | OUTPATIENT
Start: 2023-03-09 | End: 2023-03-09 | Stop reason: HOSPADM

## 2023-03-09 RX ADMIN — SODIUM CHLORIDE: 9 INJECTION, SOLUTION INTRAVENOUS at 07:52:00

## 2023-03-09 RX ADMIN — LIDOCAINE HYDROCHLORIDE 50 MG: 10 INJECTION, SOLUTION EPIDURAL; INFILTRATION; INTRACAUDAL; PERINEURAL at 07:34:00

## 2023-03-09 RX ADMIN — NEOSTIGMINE METHYLSULFATE 3 MG: 1 INJECTION, SOLUTION INTRAVENOUS at 08:40:00

## 2023-03-09 RX ADMIN — DEXAMETHASONE SODIUM PHOSPHATE 4 MG: 4 MG/ML VIAL (ML) INJECTION at 07:38:00

## 2023-03-09 RX ADMIN — CEFTRIAXONE 1 G: 1 INJECTION, POWDER, FOR SOLUTION INTRAMUSCULAR; INTRAVENOUS at 07:40:00

## 2023-03-09 RX ADMIN — SODIUM CHLORIDE: 9 INJECTION, SOLUTION INTRAVENOUS at 08:57:00

## 2023-03-09 RX ADMIN — ONDANSETRON 4 MG: 2 INJECTION INTRAMUSCULAR; INTRAVENOUS at 07:38:00

## 2023-03-09 RX ADMIN — GLYCOPYRROLATE 0.6 MG: 0.2 INJECTION, SOLUTION INTRAMUSCULAR; INTRAVENOUS at 08:40:00

## 2023-03-09 NOTE — OPERATIVE REPORT
OPERATIVE REPORT  DATE OF SURGERY: 3/9/2023  PREOPERATIVE DIAGNOSIS: right ureteral stone  POSTOPERATIVE DIAGNOSIS: same  PROCEDURE: Cystoscopy, right Ureteroscopy, laser lithotripsy, right retrograde pyelogram, placement of 6 x 26cm double j stent on a string  SURGEON: Sarthak Alexandra MD  ASSISTANT: none  ANESTHESIA: general  FLUIDS: per anesthesia  URINE OUTPUT: n./a  ESTIMATED BLOOD LOSS: 3cc  SPECIMENS: right ureter stone and kidney stone  CONDITION: Stable to PACU    INDICATIONS: 79-year-old man with an obstructing 5 mm left ureteral stone who underwent stent placement on 1/23/2022. He scheduled his surgery for 3/8/2023. PROCEDURE: The patient was met in the preoperative holding area. Appropriate informed consent was obtained and the patient was brought to the operative suite. ? Appropriate timeout was performed per hospital protocol. After the successful induction of general anesthesia, the patient was placed in the dorsal lithotomy position. ?Pressure points were meticulously padded. The patient was prepped and draped in a standard sterile fashion and IV ampicillin and ceftriaxon3 was given as preoperative prophylaxis. At this point, we passed a 22-Vietnamese cystoscope per urethra into the bladder. ? We identified the right ureteric orifice and used fluoroscopic guidance to cannulate this with a .038 Sensor guidewire to the level of the kidney. The prior stent was removed and cannulated with a second wire to the kidney. We attempted to put an access sheath but it would not pass. We therefore used a semirigid scope and cleared the distal ureter - it was tight. We kept one wire as a safety wire and an over our working wire, we passed a 12/14-Vietnamese ureteral access sheath keeping one wire behind as a safety wire. We then passed a ureteroscope through the access sheath up into the kidney. The stone was seen  and was medium in size.  We brought in the 200 nanometer Track tip laser fiber on variable settings we ablated into smaller pieces. The tipless nitinol basket was used to completely extract all of the significant fragments and then we confirmed this with ureteroscopy again. ?We brought the laser back in and dusted a few more tiny fragments with the laser, but then there was nothing significant remaining. In the kidney we basketed out a small 3mm stone attached to the calyx. There were randalls plaques. ? At this point, the laser fiber was removed and the basket was removed. ? The access sheath was removed while scoping the entire ureter without evidence of any trauma or stone. ? Using the safety wire and fluoroscopic guidance, we passed a 6-Belizean x 26 cm stent with a nice coil in the right kidney and a nice coil in the bladder, the string was left. We secured the string to the penis with Mastisol, steri strips and Tegaderm. ?The procedure was then complete. Patient was awoken from anesthesia and taken to the recovery room in stable condition. All counts were correct x2. IMPRESSION: s/p right urs all stones treated - stent x 7 days. rbus in 6 weeks    PLAN:  1. Tamsulosin, 3d abx  2. rbus 6 weeks  3.  Stent on string 7 days

## 2023-03-09 NOTE — PLAN OF CARE
Patient arrived with b/p 94/66. Denied any light headedness and dizziness. Patient states he has lost 15 pounds over the last two months with stress and the pain from th kidney stones. Iv place in the right wrist, about one to two minutes post iv insertion patient c/o of nausea, and sweating. Patient's b/p dropped to 76.44. .  Dr. Marion Scott notified and at bedside. Patient place in [facility] and iv bolus started. Dr. Marion Scott administered 0.25mg of Robinul. Patient's b/p up to 100/60. Patient stated he feels much better. Bolus of 500 ml, of 0.9NS given as per order. B/p 117/60.

## 2023-03-09 NOTE — ANESTHESIA PROCEDURE NOTES
Airway  Date/Time: 3/9/2023 7:36 AM  Urgency: elective    Airway not difficult    General Information and Staff    Patient location during procedure: OR  Anesthesiologist: Omar Royal MD  Resident/CRNA: Tracie Junior CRNA  Performed: CRNA   Performed by: Tracie Junior CRNA  Authorized by: Omar Royal MD      Indications and Patient Condition  Indications for airway management: anesthesia  Spontaneous ventilation: present  Sedation level: no sedation  Preoxygenated: yes  Patient position: sniffing  MILS maintained throughout  Mask difficulty assessment: 0 - not attempted  No planned trial extubation    Final Airway Details  Final airway type: endotracheal airway      Successful airway: ETT  Cuffed: yes   Successful intubation technique: direct laryngoscopy  Endotracheal tube insertion site: oral  Blade: Dionicio  Blade size: #3  ETT size (mm): 7.5    Cormack-Lehane Classification: grade I - full view of glottis  Placement verified by: chest auscultation and capnometry   Measured from: lips  ETT to lips (cm): 22  Number of attempts at approach: 1  Ventilation between attempts: none  Number of other approaches attempted: 0

## 2023-03-09 NOTE — TELEPHONE ENCOUNTER
I spoke with patient and assisted in scheduling follow up appt for 04/17/23 with Lucho Friends and I provided the central scheduling number to do US before office visit. Pt verbalized understanding.

## 2023-03-12 LAB — CALCULI MASS: 20 MG

## 2023-03-13 ENCOUNTER — TELEPHONE (OUTPATIENT)
Dept: SURGERY | Facility: CLINIC | Age: 43
End: 2023-03-13

## 2023-03-13 ENCOUNTER — TELEPHONE (OUTPATIENT)
Dept: FAMILY MEDICINE CLINIC | Facility: CLINIC | Age: 43
End: 2023-03-13

## 2023-03-14 NOTE — TELEPHONE ENCOUNTER
I called pt and he stated he has stomach pain, Rt sided flank pain and Rt testicular pain at level 9 and he has nothing to take for the pain. I asked about the Ketoralac that was prescribed and he stated that he cant take it because it causes stomach upset and that doctor told  Him that it didn't and he looked it up on google and it does cause stomach upset and now he's really mad that she lied and he's gonna come over and deal with her. I suggested pt go back to the ER if his pain is that severe and he stated what are they [place] [employer] do there because he was already there and they didn't do anything for him and he has all of this pain and no pain med. I asked if he can take some Tylenol and he stated he did and I asked how long ago and he stated last night. I suggested he take 2 tylenol  mg tabs now and he then stated he couldn't because he was working and it would cause him to be drowsy. I explained that Tylenol does not have any narcotic or sedative properties. Pt then stated he wants to get this thing out of his penis because its pinching him and causing him to bleed. I explained that these are typical symptoms related to the stent and I suggested he push fluids. I told pt that the instructions are that he remove the stent on thurs. And pt stated there is no way I can remove this thing myself because I get sick just looking at it. I told pt that I can arrange a N/V for thurs to remove the stent and we arranged an appt for 8:40 am. I gave him the address to the office and directions.

## 2023-03-14 NOTE — TELEPHONE ENCOUNTER
RN called patient. S/p lithotripsy /stent exchange on 3/9. No answer. Left message.  Note, he has post op appt with Dr Paris Crockett on 4/17

## 2023-03-14 NOTE — TELEPHONE ENCOUNTER
On call  Patient reports that he is stent moved, he has been having severe pain and he reports that he had clots passed this morning, he was wondering what he should be doing at this time, he tried to control it but he did not receive any response from them, he is wondering if he can get the refill in his narcotics, he reports that he had very bad tolerance to ketorolac. As we were discussing medication refill patient reports that he felt that he was going to faint as the pain was severe. Explained to patient that at this time I would recommend for him to go to the ER and be evaluated, I would not recommend to continue taking pain medication especially if he is having recurrence of bleeding which he does report had stopped for about 3 days. Patient verbalized understanding. 3

## 2023-03-16 ENCOUNTER — NURSE ONLY (OUTPATIENT)
Dept: SURGERY | Facility: CLINIC | Age: 43
End: 2023-03-16

## 2023-03-16 DIAGNOSIS — N20.1 URETERAL STONE: ICD-10-CM

## 2023-03-16 PROCEDURE — 3079F DIAST BP 80-89 MM HG: CPT | Performed by: UROLOGY

## 2023-03-16 PROCEDURE — 51700 IRRIGATION OF BLADDER: CPT | Performed by: UROLOGY

## 2023-03-16 PROCEDURE — 3074F SYST BP LT 130 MM HG: CPT | Performed by: UROLOGY

## 2023-03-17 ENCOUNTER — HOSPITAL ENCOUNTER (EMERGENCY)
Facility: HOSPITAL | Age: 43
Discharge: HOME | End: 2023-03-17
Attending: EMERGENCY MEDICINE
Payer: COMMERCIAL

## 2023-03-17 ENCOUNTER — TELEPHONE (OUTPATIENT)
Dept: SURGERY | Facility: CLINIC | Age: 43
End: 2023-03-17

## 2023-03-17 ENCOUNTER — APPOINTMENT (OUTPATIENT)
Dept: CT IMAGING | Facility: HOSPITAL | Age: 43
End: 2023-03-17
Attending: EMERGENCY MEDICINE
Payer: COMMERCIAL

## 2023-03-17 VITALS
HEART RATE: 87 BPM | TEMPERATURE: 99 F | BODY MASS INDEX: 19.6 KG/M2 | OXYGEN SATURATION: 98 % | RESPIRATION RATE: 20 BRPM | HEIGHT: 71 IN | SYSTOLIC BLOOD PRESSURE: 111 MMHG | WEIGHT: 140 LBS | DIASTOLIC BLOOD PRESSURE: 82 MMHG

## 2023-03-17 VITALS — DIASTOLIC BLOOD PRESSURE: 80 MMHG | SYSTOLIC BLOOD PRESSURE: 108 MMHG | HEART RATE: 98 BPM

## 2023-03-17 DIAGNOSIS — R55 SYNCOPE, VASOVAGAL: Primary | ICD-10-CM

## 2023-03-17 DIAGNOSIS — N45.1 EPIDIDYMITIS: ICD-10-CM

## 2023-03-17 LAB
ANION GAP SERPL CALC-SCNC: 11 MMOL/L (ref 0–18)
APTT PPP: 27.7 SECONDS (ref 23.3–35.6)
ATRIAL RATE: 76 BPM
BASOPHILS # BLD AUTO: 0.04 X10(3) UL (ref 0–0.2)
BASOPHILS NFR BLD AUTO: 0.5 %
BILIRUB UR QL: NEGATIVE
BUN BLD-MCNC: 18 MG/DL (ref 7–18)
BUN/CREAT SERPL: 16.5 (ref 10–20)
CALCIUM BLD-MCNC: 9.3 MG/DL (ref 8.5–10.1)
CHLORIDE SERPL-SCNC: 107 MMOL/L (ref 98–112)
CLARITY UR: CLEAR
CO2 SERPL-SCNC: 23 MMOL/L (ref 21–32)
COLOR UR: YELLOW
CREAT BLD-MCNC: 1.09 MG/DL
DEPRECATED RDW RBC AUTO: 37.8 FL (ref 35.1–46.3)
EOSINOPHIL # BLD AUTO: 0.01 X10(3) UL (ref 0–0.7)
EOSINOPHIL NFR BLD AUTO: 0.1 %
ERYTHROCYTE [DISTWIDTH] IN BLOOD BY AUTOMATED COUNT: 11.9 % (ref 11–15)
GFR SERPLBLD BASED ON 1.73 SQ M-ARVRAT: 87 ML/MIN/1.73M2 (ref 60–?)
GLUCOSE BLD-MCNC: 132 MG/DL (ref 70–99)
GLUCOSE BLDC GLUCOMTR-MCNC: 92 MG/DL (ref 70–99)
GLUCOSE UR-MCNC: 50 MG/DL
HCT VFR BLD AUTO: 45 %
HGB BLD-MCNC: 15.8 G/DL
IMM GRANULOCYTES # BLD AUTO: 0.03 X10(3) UL (ref 0–1)
IMM GRANULOCYTES NFR BLD: 0.3 %
INR BLD: 1.07 (ref 0.85–1.16)
KETONES UR-MCNC: NEGATIVE MG/DL
LEUKOCYTE ESTERASE UR QL STRIP.AUTO: NEGATIVE
LYMPHOCYTES # BLD AUTO: 1.07 X10(3) UL (ref 1–4)
LYMPHOCYTES NFR BLD AUTO: 12.3 %
MCH RBC QN AUTO: 30.3 PG (ref 26–34)
MCHC RBC AUTO-ENTMCNC: 35.1 G/DL (ref 31–37)
MCV RBC AUTO: 86.4 FL
MONOCYTES # BLD AUTO: 0.51 X10(3) UL (ref 0.1–1)
MONOCYTES NFR BLD AUTO: 5.9 %
NEUTROPHILS # BLD AUTO: 7.04 X10 (3) UL (ref 1.5–7.7)
NEUTROPHILS # BLD AUTO: 7.04 X10(3) UL (ref 1.5–7.7)
NEUTROPHILS NFR BLD AUTO: 80.9 %
NITRITE UR QL STRIP.AUTO: NEGATIVE
OSMOLALITY SERPL CALC.SUM OF ELEC: 296 MOSM/KG (ref 275–295)
P AXIS: 47 DEGREES
P-R INTERVAL: 102 MS
PH UR: 7 [PH] (ref 5–8)
PLATELET # BLD AUTO: 248 10(3)UL (ref 150–450)
POTASSIUM SERPL-SCNC: 4.9 MMOL/L (ref 3.5–5.1)
PROT UR-MCNC: NEGATIVE MG/DL
PROTHROMBIN TIME: 13.8 SECONDS (ref 11.6–14.8)
Q-T INTERVAL: 362 MS
QRS DURATION: 80 MS
QTC CALCULATION (BEZET): 407 MS
R AXIS: 85 DEGREES
RBC # BLD AUTO: 5.21 X10(6)UL
SODIUM SERPL-SCNC: 141 MMOL/L (ref 136–145)
SP GR UR STRIP: 1.02 (ref 1–1.03)
T AXIS: 74 DEGREES
TROPONIN I HIGH SENSITIVITY: 11 NG/L
UROBILINOGEN UR STRIP-ACNC: <2
VENTRICULAR RATE: 76 BPM
VIT C UR-MCNC: NEGATIVE MG/DL
WBC # BLD AUTO: 8.7 X10(3) UL (ref 4–11)

## 2023-03-17 PROCEDURE — 85730 THROMBOPLASTIN TIME PARTIAL: CPT | Performed by: EMERGENCY MEDICINE

## 2023-03-17 PROCEDURE — 81001 URINALYSIS AUTO W/SCOPE: CPT | Performed by: EMERGENCY MEDICINE

## 2023-03-17 PROCEDURE — 96365 THER/PROPH/DIAG IV INF INIT: CPT

## 2023-03-17 PROCEDURE — 82962 GLUCOSE BLOOD TEST: CPT

## 2023-03-17 PROCEDURE — 96375 TX/PRO/DX INJ NEW DRUG ADDON: CPT

## 2023-03-17 PROCEDURE — 85025 COMPLETE CBC W/AUTO DIFF WBC: CPT | Performed by: EMERGENCY MEDICINE

## 2023-03-17 PROCEDURE — 85610 PROTHROMBIN TIME: CPT | Performed by: EMERGENCY MEDICINE

## 2023-03-17 PROCEDURE — 93010 ELECTROCARDIOGRAM REPORT: CPT

## 2023-03-17 PROCEDURE — 93005 ELECTROCARDIOGRAM TRACING: CPT

## 2023-03-17 PROCEDURE — 99285 EMERGENCY DEPT VISIT HI MDM: CPT

## 2023-03-17 PROCEDURE — 80048 BASIC METABOLIC PNL TOTAL CA: CPT | Performed by: EMERGENCY MEDICINE

## 2023-03-17 PROCEDURE — 96361 HYDRATE IV INFUSION ADD-ON: CPT

## 2023-03-17 PROCEDURE — 84484 ASSAY OF TROPONIN QUANT: CPT | Performed by: EMERGENCY MEDICINE

## 2023-03-17 PROCEDURE — 74176 CT ABD & PELVIS W/O CONTRAST: CPT | Performed by: EMERGENCY MEDICINE

## 2023-03-17 RX ORDER — MORPHINE SULFATE 4 MG/ML
4 INJECTION, SOLUTION INTRAMUSCULAR; INTRAVENOUS ONCE
Status: COMPLETED | OUTPATIENT
Start: 2023-03-17 | End: 2023-03-17

## 2023-03-17 RX ORDER — LEVOFLOXACIN 500 MG/1
500 TABLET, FILM COATED ORAL DAILY
Qty: 10 TABLET | Refills: 0 | Status: SHIPPED | OUTPATIENT
Start: 2023-03-17 | End: 2023-03-27

## 2023-03-17 RX ORDER — LEVOFLOXACIN 5 MG/ML
500 INJECTION, SOLUTION INTRAVENOUS ONCE
Status: COMPLETED | OUTPATIENT
Start: 2023-03-17 | End: 2023-03-17

## 2023-03-17 RX ORDER — HYDROCODONE BITARTRATE AND ACETAMINOPHEN 5; 325 MG/1; MG/1
1 TABLET ORAL EVERY 6 HOURS PRN
Qty: 16 TABLET | Refills: 0 | Status: SHIPPED | OUTPATIENT
Start: 2023-03-17 | End: 2023-03-24

## 2023-03-17 RX ORDER — ONDANSETRON 2 MG/ML
4 INJECTION INTRAMUSCULAR; INTRAVENOUS ONCE
Status: COMPLETED | OUTPATIENT
Start: 2023-03-17 | End: 2023-03-17

## 2023-03-17 NOTE — PROGRESS NOTES
-Late Entry=  -Pt was scheduled 3/16/23 for 8:40am NV to remove dangle stent; however, as a \"No Show. \"  -He presented to Urology  @ 1pm; stating \"I tried calling but was transferred all over this place. \"  No TE noted in Epic.  -D/t pt behavior, [employer] (behind curtain) present in room; identity verified with name & .  -Positions self on exam table; draped for privacy to lower sweats to mid-thigh; dangle string secured to penis with steri-strips & tegaderm; pt asked to take in a deep breath thru his nose & exhale thru his mouth as dangle was slowly removed with pt reporting :I don't feel that; wipes X2 applied to penis tip & pt redressed self. -Pt asked if he should continue taking Flomax; instructed to continue taking Rx till 23 OV with AR. -Encounter complete.

## 2023-03-17 NOTE — ED INITIAL ASSESSMENT (HPI)
Patient arrives via wheelchair with complaints of postop issues. Kidney stent removal 3/16, now having syncopal episodes at home.    +right sided abdominal/flank pain

## 2023-03-17 NOTE — TELEPHONE ENCOUNTER
Pt called stating pt has passed out three times. Pt refused to get in the ambulance. Pt is going to come to the office. Call transferred to the nurse.

## 2023-03-17 NOTE — ED QUICK NOTES
As soon this nurse came in to see the patient, he didn't want any IV in. Patient was saying that \"let's just figure it out everything! You guys missed before a few times\"  Suddenly, patient started to say: \"Here it comes! Here is come! \" Patient almost started to faint. He agreed that he was ok with an IV. When this nurse put the IV in, patient fainted, his eyes were opened and ekg line was flat for couple seconds. Patient had a pulse. Patient suddenly jumped out of bed looking confused. His eyes were very dialated. Patient was trying to get out of the bed to urinate. Dr Srinivas Du called.

## 2023-03-17 NOTE — TELEPHONE ENCOUNTER
Routed to Dr. Shakir Bee:  Please review & advise. Thank You, [id number] transferred pt to Urology; identity verified with name & .    -Pt reports \"fainting 3X\" and \"having a BP of 68/?\"; ambulance called but he didn't go with them \"b/c I shit & pissed all over myself. \"    -Pt educated that the clinic setting is for scheduled/ non-emergent visits. Advised to call 911 for ambulance transport to the ER, since his BP is 68? .    -States he \"is cleaned up now & I have a ride that can take me to the ER.\"    -I advised him to call 911, as I am concerned about his BP & the person driving cannot drive & take care of him. -Girlfriend, Fady Maxwell, also on call asking Dodie Desir this happened. \"  I responded we are unable to determine & pt is safest traveling per ambulance with a reported BP of 68/?.    -ER Charge RN/ Christy Glasgow apprised of above situation.    -Encounter complete.

## 2023-03-18 NOTE — ED QUICK NOTES
Accessed patients chart with verbal permission over the phone. Work note edited and reprinted for patient.

## 2023-03-30 ENCOUNTER — OFFICE VISIT (OUTPATIENT)
Dept: FAMILY MEDICINE CLINIC | Facility: CLINIC | Age: 43
End: 2023-03-30

## 2023-03-30 VITALS
DIASTOLIC BLOOD PRESSURE: 66 MMHG | WEIGHT: 148 LBS | HEART RATE: 109 BPM | SYSTOLIC BLOOD PRESSURE: 105 MMHG | BODY MASS INDEX: 21.92 KG/M2 | HEIGHT: 69 IN | TEMPERATURE: 98 F | RESPIRATION RATE: 20 BRPM

## 2023-03-30 DIAGNOSIS — N20.1 URETERAL STONE: Primary | ICD-10-CM

## 2023-03-30 DIAGNOSIS — M54.9 DORSALGIA: ICD-10-CM

## 2023-03-30 DIAGNOSIS — L30.9 DERMATITIS: ICD-10-CM

## 2023-03-30 PROCEDURE — 3074F SYST BP LT 130 MM HG: CPT | Performed by: FAMILY MEDICINE

## 2023-03-30 PROCEDURE — 3078F DIAST BP <80 MM HG: CPT | Performed by: FAMILY MEDICINE

## 2023-03-30 PROCEDURE — 3008F BODY MASS INDEX DOCD: CPT | Performed by: FAMILY MEDICINE

## 2023-03-30 PROCEDURE — 99214 OFFICE O/P EST MOD 30 MIN: CPT | Performed by: FAMILY MEDICINE

## 2023-03-30 RX ORDER — ACETAMINOPHEN AND CODEINE PHOSPHATE 300; 30 MG/1; MG/1
1 TABLET ORAL EVERY 6 HOURS PRN
Qty: 30 TABLET | Refills: 0 | Status: SHIPPED | OUTPATIENT
Start: 2023-03-30

## 2023-03-30 RX ORDER — CLONAZEPAM 0.5 MG/1
0.5 TABLET ORAL 2 TIMES DAILY PRN
Qty: 60 TABLET | Refills: 0 | Status: SHIPPED | OUTPATIENT
Start: 2023-03-30

## 2023-04-12 ENCOUNTER — PATIENT MESSAGE (OUTPATIENT)
Dept: SURGERY | Facility: CLINIC | Age: 43
End: 2023-04-12

## 2023-04-14 NOTE — TELEPHONE ENCOUNTER
-Routed to Dr. Gregoria Nicole as an FYI:  Thank Orland Runner RN    -S/w pt; identity verified with name & .  -Pt reports he will not be able to complete the US Kidney/ Bladder ordered on 3/9/23 by AR, in time for his 23 post-op OV. -In addition, he states, \"I may not get there till 8:30am b/c I drop my Dtr off @ school. \"  -Encounter complete.

## 2023-04-17 ENCOUNTER — OFFICE VISIT (OUTPATIENT)
Dept: SURGERY | Facility: CLINIC | Age: 43
End: 2023-04-17

## 2023-04-17 DIAGNOSIS — N20.0 NEPHROLITHIASIS: Primary | ICD-10-CM

## 2023-04-17 PROCEDURE — 99213 OFFICE O/P EST LOW 20 MIN: CPT | Performed by: UROLOGY

## 2023-04-24 ENCOUNTER — NURSE TRIAGE (OUTPATIENT)
Dept: FAMILY MEDICINE CLINIC | Facility: CLINIC | Age: 43
End: 2023-04-24

## 2023-05-02 RX ORDER — CLONAZEPAM 0.5 MG/1
TABLET ORAL
Qty: 60 TABLET | Refills: 0 | Status: SHIPPED | OUTPATIENT
Start: 2023-05-02

## 2023-06-01 RX ORDER — CLONAZEPAM 0.5 MG/1
0.5 TABLET ORAL 2 TIMES DAILY PRN
Qty: 60 TABLET | Refills: 0 | Status: SHIPPED | OUTPATIENT
Start: 2023-06-01

## 2023-06-01 NOTE — TELEPHONE ENCOUNTER
Message noted: Chart reviewed and may refill medication as requested. Script sent to listed pharmacy by secure method.     Pt notified through [facility]

## 2023-07-08 RX ORDER — CLONAZEPAM 0.5 MG/1
0.5 TABLET ORAL 2 TIMES DAILY PRN
Qty: 60 TABLET | Refills: 0 | OUTPATIENT
Start: 2023-07-08

## 2023-07-08 RX ORDER — CLONAZEPAM 0.5 MG/1
0.5 TABLET ORAL 2 TIMES DAILY PRN
Qty: 60 TABLET | Refills: 0 | Status: SHIPPED
Start: 2023-07-08 | End: 2023-07-08

## 2023-07-08 RX ORDER — CLONAZEPAM 0.5 MG/1
0.5 TABLET ORAL 2 TIMES DAILY PRN
Qty: 60 TABLET | Refills: 0 | Status: SHIPPED | OUTPATIENT
Start: 2023-07-08

## 2023-07-08 NOTE — TELEPHONE ENCOUNTER
Please review. Protocol failed or has no protocol.     Requested Prescriptions   Pending Prescriptions Disp Refills    CLONAZEPAM 0.5 MG Oral Tab [Pharmacy Med Name: CLONAZEPAM 0.5MG TABLETS] 60 tablet 0     Sig: TAKE 1 TABLET(0.5 MG) BY MOUTH TWICE DAILY AS NEEDED       There is no refill protocol information for this order          Recent Outpatient Visits              2 months ago Nephrolithiasis    Shubham Fowler MD    Office Visit    3 months ago Ureteral stone    Adam Ta MD    Office Visit    3 months ago Ureteral stone    [facility],[address], [address], [place]    Nurse Only    1 year ago Dorsalgia of thoracic region    Adam Ta MD    Office Visit    2 years ago Dermatitis    Adam Ta MD    Office Visit

## 2023-08-08 RX ORDER — CLONAZEPAM 0.5 MG/1
0.5 TABLET ORAL 2 TIMES DAILY PRN
Qty: 60 TABLET | Refills: 0 | Status: SHIPPED | OUTPATIENT
Start: 2023-08-08

## 2023-08-08 RX ORDER — FINASTERIDE 1 MG/1
TABLET, FILM COATED ORAL
Qty: 90 TABLET | Refills: 3 | Status: SHIPPED | OUTPATIENT
Start: 2023-08-08

## 2023-08-08 NOTE — TELEPHONE ENCOUNTER
Message noted: Chart reviewed and may refill medication as requested. Script sent to listed pharmacy by secure method.     Pt notified through [facility]

## 2023-08-15 ENCOUNTER — NURSE TRIAGE (OUTPATIENT)
Dept: FAMILY MEDICINE CLINIC | Facility: CLINIC | Age: 43
End: 2023-08-15

## 2023-08-15 NOTE — TELEPHONE ENCOUNTER
Patient calling (identified name and ) states he will need a note to be able to return to work after being seen. Asking if Dr. Lorri Tomas can fit him in on  so he can return to work sooner. If unable to see until , patient asking for a note to let his employer know that he has an upcoming appointment on , which is the soonest available. Patient declined being seen by other providers. Please advise. Note can be sent via [address]

## 2023-08-15 NOTE — TELEPHONE ENCOUNTER
Pt stated that he taking medication for depression from his psychiatrist. Pt is currently on Wellbutrin 200 mg once a day. Psychiatrist jassi llike to add the Fluoxetine 20 mg since he feels his depression has gotten worse. Pt will like to discuss this with Dr. Margareth Camargo as he has known Dr. Margareth Camargo since he was little. Pt also will like to discuss his back pain with Dr. Margareth Camargo but per pt he just needs to chiropractor and will talk to Dr. Margareth Camargo about it. Pt only wants to see Dr Margareth Camargo as he feels comfortable with him. Dr. Ana Lilia Mitchell has no appt until Thursday. Pt will like the appt for Thursday. Appt given to pt per his request. Inform pt if his depression gets worse he should go to ER. Pt verbalized understanding.      Future Appointments   Date Time Provider Zack Archuleta   8/17/2023 10:20 AM Toshia Rodriguez MD [facility]       Reason for Disposition   Depression is worsening (e.g.,sleeping poorly, less able to do activities of daily living)    Protocols used: Depression-A-[place]

## 2023-08-15 NOTE — TELEPHONE ENCOUNTER
May write note for employer as requested as cannot see pt until 8/17/23. Letter generated as requested and sent to patient via [facility].  Pt notified through [facility]

## 2024-06-13 NOTE — TELEPHONE ENCOUNTER
Patient needs a refill on:    FINASTERIDE 1 MG Oral Tab 90 tablet 3 8/8/2023 --   Sig:   TAKE 1 TABLET(1 MG) BY MOUTH DAILY     Route:   (none)         [id number] - [address] [phone], [phone]

## 2024-06-17 RX ORDER — FINASTERIDE 1 MG/1
1 TABLET, FILM COATED ORAL DAILY
Qty: 90 TABLET | Refills: 3 | Status: SHIPPED | OUTPATIENT
Start: 2024-06-17

## 2024-06-17 RX ORDER — FINASTERIDE 1 MG/1
1 TABLET, FILM COATED ORAL DAILY
Qty: 90 TABLET | Refills: 3 | Status: SHIPPED | OUTPATIENT
Start: 2024-06-17 | End: 2024-06-17

## 2024-06-17 NOTE — TELEPHONE ENCOUNTER
REFILL PASSED PER [facility]    Requested Prescriptions   Pending Prescriptions Disp Refills    finasteride 1 MG Oral Tab 90 tablet 3     Sig: Take 1 tablet (1 mg total) by mouth daily.       Genitourinary Medications Passed - 6/13/2024  2:22 PM        Passed - Patient does not have pulmonary hypertension on problem list        Passed - In person appointment or virtual visit in the past 12 mos or appointment in next 3 mos     Recent Outpatient Visits              10 [facility]    [facility]    [facility]    [facility]    1 year [facility]    [facility]    [facility]    2 years ago Dorsalgia of thoracic region    [facility]    [facility]                             Recent Outpatient Visits              10 months ago [facility]xiety and depression    [facility]    [facility]    1 year [facility]    [facility]    1 year ago [facility]    [facility]    [facility]    2 years ago Dorsalgia of thoracic region    [facility]    [facility]

## 2024-06-17 NOTE — TELEPHONE ENCOUNTER
REFILL PASSED PER [facility] PROTOCOLS    Requested Prescriptions   Pending Prescriptions Disp Refills    finasteride 1 MG Oral Tab 90 tablet 3     Sig: Take 1 tablet (1 mg total) by mouth daily.       Genitourinary Medications Passed - 6/17/2024  9:44 AM        Passed - Patient does not have pulmonary hypertension on problem list        Passed - In person appointment or virtual visit in the past 12 mos or appointment in next 3 mos     Recent Outpatient Visits              10 months ago Anxiety and depression    [facility]    [facility]    1 year [facility]    [facility]    [facility]    1 [facility]    2 years ago Dorsalgia of thoracic region    [facility]    [facility]                       Signed Prescriptions Disp Refills    finasteride 1 MG Oral Tab 90 tablet 3     Sig: Take 1 tablet (1 mg total) by mouth daily.       Genitourinary Medications Passed - 6/13/2024  2:22 PM        Passed - Patient does not have pulmonary hypertension on problem list        Passed - In person appointment or virtual visit in the past 12 mos or appointment in next 3 mos     Recent Outpatient Visits              10 months ago [facility] and depression    [facility]    [facility]    1 year ago [facility]    [facility]    [facility]    1 year ago [facility]    [facility]    [facility]    1 year ago Ureteral stone     [place] [place]    Nurse Only    2 years ago [facility]    [facility]                             Recent Outpatient Visits              10 months [facility]    [facility]    [facility]    Office Visit    1 year ago [facility]    Office Visit    1 year ago Ureteral stone    [place] [place]    Nurse Only    2 years ago Dorsalgia of thoracic region    [facility]    Office Visit

## 2024-09-04 ENCOUNTER — TELEPHONE (OUTPATIENT)
Dept: FAMILY MEDICINE CLINIC | Facility: CLINIC | Age: 44
End: 2024-09-04

## 2024-09-05 ENCOUNTER — OFFICE VISIT (OUTPATIENT)
Dept: FAMILY MEDICINE CLINIC | Facility: CLINIC | Age: 44
End: 2024-09-05

## 2024-09-05 VITALS
HEART RATE: 94 BPM | BODY MASS INDEX: 22.15 KG/M2 | HEIGHT: 69.7 IN | WEIGHT: 153 LBS | SYSTOLIC BLOOD PRESSURE: 99 MMHG | TEMPERATURE: 98 F | RESPIRATION RATE: 20 BRPM | DIASTOLIC BLOOD PRESSURE: 60 MMHG

## 2024-09-05 DIAGNOSIS — Z00.00 ROUTINE PHYSICAL EXAMINATION: Primary | ICD-10-CM

## 2024-09-05 DIAGNOSIS — L30.9 DERMATITIS: ICD-10-CM

## 2024-09-05 DIAGNOSIS — M25.552 BILATERAL HIP PAIN: ICD-10-CM

## 2024-09-05 DIAGNOSIS — M25.551 BILATERAL HIP PAIN: ICD-10-CM

## 2024-09-05 PROCEDURE — 3008F BODY MASS INDEX DOCD: CPT | Performed by: FAMILY MEDICINE

## 2024-09-05 PROCEDURE — 99396 PREV VISIT EST AGE 40-64: CPT | Performed by: FAMILY MEDICINE

## 2024-09-05 PROCEDURE — 3074F SYST BP LT 130 MM HG: CPT | Performed by: FAMILY MEDICINE

## 2024-09-05 PROCEDURE — 3078F DIAST BP <80 MM HG: CPT | Performed by: FAMILY MEDICINE

## 2024-09-05 RX ORDER — MOMETASONE FUROATE 1 MG/G
CREAM TOPICAL
Qty: 45 G | Refills: 0 | Status: SHIPPED | OUTPATIENT
Start: 2024-09-05

## 2024-09-05 NOTE — PROGRESS NOTES
Subjective:   Patient ID: Link Boss is a 43 year old male.    Patient is here for routine physical exam. No acute issues. No significant chronic medical problems. Patient is requesting testing.   Diet and exercise have been fairly good.  Past medical history, family history, and social history were reviewed.  Pt has had rash under the right arm. Similar rash of left side that resolved.   Pt also has had some recent bilateral hip pains. Pt stands and walks a lot for work. No trauma or injury. Pt is planning on seeing specialist for this.         History/Other:   Review of Systems   Constitutional: Negative.  Negative for fever.   HENT: Negative.     Eyes: Negative.    Respiratory: Negative.  Negative for shortness of breath.    Cardiovascular: Negative.  Negative for chest pain.   Gastrointestinal: Negative.    Endocrine: Negative.    Genitourinary: Negative.    Musculoskeletal:  Positive for arthralgias.   Skin:  Positive for rash.   Allergic/Immunologic: Negative.    Neurological: Negative.  Negative for dizziness and headaches.   Hematological: Negative.    Psychiatric/Behavioral: Negative.  Negative for dysphoric mood. The patient is not nervous/anxious.      Current Outpatient Medications   Medication Sig Dispense Refill    Mometasone Furoate 0.1 % External Cream Apply a small dab to affected area of skin once per day as needed. 45 g 0    finasteride 1 MG Oral Tab Take 1 tablet (1 mg total) by mouth daily. 90 tablet 3    FLUoxetine 20 MG Oral Cap       buPROPion  MG Oral Tablet 12 Hr       buPROPion HCl ER, SR, 200 MG Oral Tablet 12 Hr       ADDERALL 30 MG Oral Tab Take 1 tablet (30 mg total) by mouth 2 (two) times daily.      Ketorolac Tromethamine 10 MG Oral Tab Take 1 tablet (10 mg total) by mouth every 6 (six) hours as needed for Pain. 5 tablet 0    Naloxone HCl 4 MG/0.1ML Nasal Liquid 4 mg by Nasal route as needed. If patient remains unresponsive, repeat dose in other nostril 2-5 minutes after first  dose. 1 kit 0    clonazePAM 0.5 MG Oral Tab Take 1 tablet (0.5 mg total) by mouth 2 (two) times daily as needed. 60 tablet 0    HYDROmorphone 2 MG Oral Tab Take 1 tablet (2 mg total) by mouth every 6 (six) hours as needed for Pain (for severe pain). (Patient not taking: Reported on 9/5/2024) 3 tablet 0     Allergies:No Known Allergies    Objective:   Physical Exam  Constitutional:       Appearance: Normal appearance. He is well-developed.   HENT:      Head: Normocephalic.      Right Ear: Tympanic membrane, ear canal and external ear normal.      Left Ear: Tympanic membrane, ear canal and external ear normal.      Nose: Nose normal.      Mouth/Throat:      Mouth: Mucous membranes are moist.      Pharynx: No oropharyngeal exudate or posterior oropharyngeal erythema.   Eyes:      Conjunctiva/sclera: Conjunctivae normal.   Cardiovascular:      Rate and Rhythm: Normal rate and regular rhythm.      Pulses: Normal pulses.      Heart sounds: Normal heart sounds.   Pulmonary:      Effort: Pulmonary effort is normal. No respiratory distress.      Breath sounds: Normal breath sounds. No wheezing or rales.   Abdominal:      General: Abdomen is flat. There is no distension.      Palpations: Abdomen is soft. There is no mass.      Tenderness: There is no abdominal tenderness.   Musculoskeletal:         General: Normal range of motion.      Cervical back: Normal range of motion and neck supple.      Right hip: Normal range of motion.      Left hip: Normal range of motion.      Comments: Some pain of the hips to palpation   Skin:     General: Skin is warm.      Findings: Rash present.      Comments: Rash of the right axilla area   Neurological:      General: No focal deficit present.      Mental Status: He is alert and oriented to person, place, and time.      Sensory: No sensory deficit.      Deep Tendon Reflexes: Reflexes are normal and symmetric. Reflexes normal.   Psychiatric:         Mood and Affect: Mood normal.          Behavior: Behavior normal.         Assessment & Plan:   1. Routine physical examination:  - Exam is unremarkable. Screening tests were discussed, and after discussion, will check lab work as below. Healthy diet, exercise, and weight were discussed. To call if problems and follow up and further management after testing. Routine follow up.     2. Bilateral hip pain:  - After discussion, to see specialist for further evaluation and treatment; To call if any significant symptoms.      3. Dermatitis:  - After discussion with patient, elocon cream as directed; Discussed over the counter treatments. To call if worse or not better; Follow up in 1-2 weeks if rash not resolved.        Orders Placed This Encounter   Procedures    Lipid Panel    Comp Metabolic Panel (14)    CBC, Platelet; No Differential       Meds This Visit:  Requested Prescriptions     Signed Prescriptions Disp Refills    Mometasone Furoate 0.1 % External Cream 45 g 0     Sig: Apply a small dab to affected area of skin once per day as needed.       Imaging & Referrals:  None

## 2024-09-21 DIAGNOSIS — F32.A ANXIETY AND DEPRESSION: ICD-10-CM

## 2024-09-21 DIAGNOSIS — F41.9 ANXIETY AND DEPRESSION: ICD-10-CM

## 2024-09-27 RX ORDER — CLONAZEPAM 0.5 MG/1
0.5 TABLET ORAL 2 TIMES DAILY PRN
Qty: 60 TABLET | Refills: 0 | Status: SHIPPED | OUTPATIENT
Start: 2024-09-27

## 2024-09-27 NOTE — TELEPHONE ENCOUNTER
Please review. Protocol Failed; No Protocol      Recent fills: 5/28/2024, 7/16/2024, 8/29/2024  Last Rx written: 7/16/2024 written by Young CUMMINS  Last office visit: 9/5/2024          Requested Prescriptions   Pending Prescriptions Disp Refills    CLONAZEPAM 0.5 MG Oral Tab [Pharmacy Med Name: CLONAZEPAM 0.5MG TABLETS] 60 tablet 0     Sig: TAKE 1 TABLET(0.5 MG) BY MOUTH TWICE DAILY AS NEEDED FOR ANXIETY       Controlled Substance Medication Failed - 9/21/2024  4:53 PM        Failed - This medication is a controlled substance - forward to provider to refill                 Recent Outpatient Visits              3 weeks [facility]    [facility] Visit    1 year ago [facility] and depression    [facility]    [facility]    1 year [facility]    [facility] Visit    1 year ago [facility]    [facility]    1 year [facility]    [facility]

## 2024-09-28 NOTE — TELEPHONE ENCOUNTER
Message noted: Chart reviewed and may refill medication as requested. Script sent to listed pharmacy by secure method.    Pt notified through [employer]

## 2024-11-29 ENCOUNTER — NURSE TRIAGE (OUTPATIENT)
Dept: FAMILY MEDICINE CLINIC | Facility: CLINIC | Age: 44
End: 2024-11-29
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
## 2024-11-30 NOTE — TELEPHONE ENCOUNTER
first Call attempt. No answer.   Unable to leave message, mailbox full     [employer] message sent.

## 2024-11-30 NOTE — TELEPHONE ENCOUNTER
Message noted. May restart fluoxetine as requested. Erx sent to listed pharmacy. To follow up for appointment to reevalua- can do video visit. Please notify patient

## 2024-12-02 NOTE — TELEPHONE ENCOUNTER
Called patient (name and  verified) and instructed on providers message below. Patient verbalized understanding and agrees to plan.    Transferred to [facility] to assist with self pay visit as patient states that his insurance is not active yet.

## 2024-12-04 ENCOUNTER — TELEPHONE (OUTPATIENT)
Dept: FAMILY MEDICINE CLINIC | Facility: CLINIC | Age: 44
End: 2024-12-04

## 2024-12-16 ENCOUNTER — NURSE TRIAGE (OUTPATIENT)
Dept: FAMILY MEDICINE CLINIC | Facility: CLINIC | Age: 44
End: 2024-12-16

## 2024-12-16 DIAGNOSIS — F41.9 ANXIETY AND DEPRESSION: ICD-10-CM

## 2024-12-16 DIAGNOSIS — F32.A ANXIETY AND DEPRESSION: ICD-10-CM

## 2024-12-16 RX ORDER — CLONAZEPAM 0.5 MG/1
0.5 TABLET ORAL 2 TIMES DAILY PRN
Qty: 60 TABLET | Refills: 0 | Status: SHIPPED | OUTPATIENT
Start: 2024-12-16

## 2024-12-16 NOTE — TELEPHONE ENCOUNTER
Medication Detail    Medication Quantity Refills Start End   clonazePAM 0.5 MG Oral Tab 60 tablet 0 12/16/2024 --   Sig:   Take 1 tablet (0.5 mg total) by mouth 2 (two) times daily as needed.     Route:   Oral     Order #:   [id number]     Script sent to pharmacy. Please notify patient    PLACIDO Toscano, FNP-C for Dr. Awad

## 2024-12-16 NOTE — TELEPHONE ENCOUNTER
Spoke to patient, full name and date of birth verified.  Informed patient of prescription sent, he will call to confirm ready for .     RN encouraged patient to call us back if the medication is not helping him.   Patient verbalized understanding and agreement with plan of care.
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
## 2025-01-20 DIAGNOSIS — F32.A ANXIETY AND DEPRESSION: ICD-10-CM

## 2025-01-20 DIAGNOSIS — F41.9 ANXIETY AND DEPRESSION: ICD-10-CM

## 2025-01-20 RX ORDER — CLONAZEPAM 0.5 MG/1
0.5 TABLET ORAL 2 TIMES DAILY PRN
Qty: 60 TABLET | Refills: 0 | Status: SHIPPED | OUTPATIENT
Start: 2025-01-20

## 2025-01-20 NOTE — TELEPHONE ENCOUNTER
Please review; protocol failed/No Protocol    Recent Fills: 09/27/2024, 11/17/2024, 12/16/2024    Last Rx Written: 12/16/2024    Last Office Visit: 09/05/2024      Requested Prescriptions   Pending Prescriptions Disp Refills    CLONAZEPAM 0.5 MG Oral Tab [Pharmacy Med Name: Clonazepam 0.5 Mg Tab Auro] 60 tablet 0     Sig: Take 1 tablet (0.5 mg total) by mouth 2 (two) times daily as needed.       Controlled Substance Medication Failed - 1/20/2025 12:10 PM        Failed - This medication is a controlled substance - forward to provider to refill        Passed - Medication is active on med list             Recent Outpatient Visits              4 months ago [facility]    Office Visit    1 year ago [facility] and depression    [facility]    Office Visit    1 year ago [facility]    Office Visit    1 year ago [facility]reteral stone    [facility]    [facility] Visit    1 year ago Ureteral stone    [facility]    Nurse Only

## 2025-01-20 NOTE — TELEPHONE ENCOUNTER
Message noted: Chart reviewed and may refill medication as requested. Script sent to listed pharmacy by secure method.    Pt notified through [employer]

## 2025-02-05 NOTE — TELEPHONE ENCOUNTER
Message noted: Chart reviewed and may refill medication as requested. Prescription sent to listed pharmacy. Pharmacy to notify patient. Pt notified through [employer]

## 2025-03-04 DIAGNOSIS — F41.9 ANXIETY AND DEPRESSION: ICD-10-CM

## 2025-03-04 DIAGNOSIS — F32.A ANXIETY AND DEPRESSION: ICD-10-CM

## 2025-03-04 RX ORDER — CLONAZEPAM 0.5 MG/1
0.5 TABLET ORAL 2 TIMES DAILY PRN
Qty: 60 TABLET | Refills: 0 | Status: SHIPPED | OUTPATIENT
Start: 2025-03-04

## 2025-03-04 NOTE — TELEPHONE ENCOUNTER
Message noted: Chart reviewed and may refill medication as requested. Script sent to listed pharmacy by secure method.    Pt notified through [employer]

## 2025-04-23 ENCOUNTER — TELEPHONE (OUTPATIENT)
Dept: FAMILY MEDICINE CLINIC | Facility: CLINIC | Age: 45
End: 2025-04-23

## 2025-04-23 DIAGNOSIS — F41.9 ANXIETY AND DEPRESSION: ICD-10-CM

## 2025-04-23 DIAGNOSIS — F32.A ANXIETY AND DEPRESSION: ICD-10-CM

## 2025-04-23 RX ORDER — CLONAZEPAM 0.5 MG/1
0.5 TABLET ORAL 2 TIMES DAILY PRN
Qty: 60 TABLET | Refills: 0 | Status: SHIPPED | OUTPATIENT
Start: 2025-04-23

## 2025-04-23 NOTE — TELEPHONE ENCOUNTER
Paged by patient, he is looking for refill of his anxiety medicine, patient states he is moving right now and is feeling panicky/anxious.  His pharmacy said that they did not get the prescription refill yet.  I verified that the prescription, clonazepam 0.5 mg, was sent into his pharmacy ([facility]) and that it was received by them at 4:45 pm today, patient aware and will likely  tomorrow.

## 2025-04-23 NOTE — TELEPHONE ENCOUNTER
Message noted: Chart reviewed and may refill medication as requested. Script sent to listed pharmacy by secure method.    Pt notified through [employer]

## 2025-05-29 DIAGNOSIS — F41.9 ANXIETY AND DEPRESSION: ICD-10-CM

## 2025-05-29 DIAGNOSIS — F32.A ANXIETY AND DEPRESSION: ICD-10-CM

## 2025-05-29 RX ORDER — CLONAZEPAM 0.5 MG/1
0.5 TABLET ORAL 2 TIMES DAILY PRN
Qty: 60 TABLET | Refills: 0 | Status: SHIPPED | OUTPATIENT
Start: 2025-05-29

## 2025-05-29 RX ORDER — FINASTERIDE 1 MG/1
1 TABLET, FILM COATED ORAL DAILY
Qty: 90 TABLET | Refills: 0 | Status: SHIPPED | OUTPATIENT
Start: 2025-05-29

## 2025-05-29 NOTE — TELEPHONE ENCOUNTER
Message noted: Chart reviewed and may refill medication as requested. Script sent to listed pharmacy by secure method.    Pt notified through [employer]

## 2025-07-04 DIAGNOSIS — F41.9 ANXIETY AND DEPRESSION: ICD-10-CM

## 2025-07-04 DIAGNOSIS — F32.A ANXIETY AND DEPRESSION: ICD-10-CM

## 2025-07-05 ENCOUNTER — NURSE TRIAGE (OUTPATIENT)
Dept: FAMILY MEDICINE CLINIC | Facility: CLINIC | Age: 45
End: 2025-07-05

## 2025-07-05 RX ORDER — CLONAZEPAM 0.5 MG/1
0.5 TABLET ORAL 2 TIMES DAILY PRN
Qty: 60 TABLET | Refills: 0 | Status: SHIPPED | OUTPATIENT
Start: 2025-07-05

## 2025-07-05 NOTE — TELEPHONE ENCOUNTER
Called patient to let his know Clonazepam was sent in by Dr. Awad.     Future Appointments   Date Time Provider Department Center   7/10/2025 10:40 AM Jody Petersen APRN [facility]

## 2025-07-05 NOTE — TELEPHONE ENCOUNTER
Message noted: Chart reviewed and may refill medication as requested. Script sent to listed pharmacy by secure method.    Pt notified through [employer]

## 2025-07-05 NOTE — TELEPHONE ENCOUNTER
Action Requested: Summary for Provider     []  Critical Lab, Recommendations Needed  [] Need Additional Advice  []   FYI    []   Need Orders  [] Need Medications Sent to Pharmacy  []  Other     SUMMARY: Per protocol, patient should be seen in office today.     Reason for call: Anxiety/Panic attack  Onset: Data Unavailable    Patient states he has been out of Fluoxetine and Clonazepam and is having \"non stop\" panic attacks. Rn scheduled him for his next day off (Thursday) and recommended he go to  today.    He said he feels like he has food poisoning with diarrhea. He said he does not feel like he is going through benzo withdrawal as he only takes the medication PRN. He feels like crying and will have trouble breathing when he gets panic attack. He did send refill request for Clonazepam.    Reason for Disposition   Patient sounds very upset or troubled to the triager    Protocols used: Anxiety and Panic Attack-A-OH

## 2025-07-10 PROBLEM — F41.0 PANIC ATTACK: Status: ACTIVE | Noted: 2025-07-10

## 2025-07-25 ENCOUNTER — NURSE TRIAGE (OUTPATIENT)
Dept: FAMILY MEDICINE CLINIC | Facility: CLINIC | Age: 45
End: 2025-07-25

## 2025-08-05 ENCOUNTER — TELEPHONE (OUTPATIENT)
Dept: FAMILY MEDICINE CLINIC | Facility: CLINIC | Age: 45
End: 2025-08-05

## 2025-08-06 DIAGNOSIS — F32.A ANXIETY AND DEPRESSION: ICD-10-CM

## 2025-08-06 DIAGNOSIS — F41.9 ANXIETY AND DEPRESSION: ICD-10-CM

## 2025-08-07 RX ORDER — CLONAZEPAM 0.5 MG/1
0.5 TABLET ORAL 2 TIMES DAILY PRN
Qty: 60 TABLET | Refills: 0 | Status: SHIPPED | OUTPATIENT
Start: 2025-08-07

## 2025-08-13 DIAGNOSIS — F41.9 ANXIETY AND DEPRESSION: ICD-10-CM

## 2025-08-13 DIAGNOSIS — F41.0 PANIC ATTACK: ICD-10-CM

## 2025-08-13 DIAGNOSIS — F32.A ANXIETY AND DEPRESSION: ICD-10-CM

## 2025-08-15 RX ORDER — BUPROPION HYDROCHLORIDE 150 MG/1
150 TABLET, EXTENDED RELEASE ORAL 2 TIMES DAILY
Qty: 180 TABLET | Refills: 3 | Status: SHIPPED | OUTPATIENT
Start: 2025-08-15 | End: 2026-08-10

## 2025-08-18 DIAGNOSIS — F32.A ANXIETY AND DEPRESSION: ICD-10-CM

## 2025-08-18 DIAGNOSIS — F41.0 PANIC ATTACK: ICD-10-CM

## 2025-08-18 DIAGNOSIS — F41.9 ANXIETY AND DEPRESSION: ICD-10-CM

## 2025-08-21 RX ORDER — FLUOXETINE HYDROCHLORIDE 40 MG/1
40 CAPSULE ORAL DAILY
Qty: 30 CAPSULE | Refills: 0 | Status: SHIPPED | OUTPATIENT
Start: 2025-08-21 | End: 2025-09-20

## 2025-08-23 RX ORDER — FINASTERIDE 1 MG/1
1 TABLET, FILM COATED ORAL DAILY
Qty: 90 TABLET | Refills: 0 | Status: SHIPPED | OUTPATIENT
Start: 2025-08-23

## (undated) DEVICE — FLEXOR, URETERAL ACCESS SHEATH WITH AQ, HYDROPHILIC COATING: Brand: FLEXOR

## (undated) DEVICE — GAMMEX® PI HYBRID SIZE 8, STERILE POWDER-FREE SURGICAL GLOVE, POLYISOPRENE AND NEOPRENE BLEND: Brand: GAMMEX

## (undated) DEVICE — SOL NACL IRRIG 0.9% 1000ML BTL

## (undated) DEVICE — NITINOL STONE RETRIEVAL BASKET: Brand: ZERO TIP

## (undated) DEVICE — ADHESIVE MASTISOL 2/3ML

## (undated) DEVICE — VIAL ISOVUE-300 10X50ML

## (undated) DEVICE — SLEEVE KENDALL SCD EXPRESS MED

## (undated) DEVICE — GAMMEX® PI HYBRID SIZE 6, STERILE POWDER-FREE SURGICAL GLOVE, POLYISOPRENE AND NEOPRENE BLEND: Brand: GAMMEX

## (undated) DEVICE — 9534HP TRANSPARENT DRSG W/FRAME: Brand: 3M™ TEGADERM™

## (undated) DEVICE — TECH FEE

## (undated) DEVICE — SOLUTION  .9 3000ML

## (undated) DEVICE — Device: Brand: CUSTOM PROCEDURE KIT

## (undated) DEVICE — TIGERTAIL 5F FLXTIP 70CM

## (undated) DEVICE — MOSES 200 FIBER

## (undated) DEVICE — CYSTO PACK: Brand: MEDLINE INDUSTRIES, INC.

## (undated) DEVICE — FORCEP RADIAL JAW 4

## (undated) DEVICE — CATH URET CONE TIP 8FR 138008

## (undated) DEVICE — ENDOSCOPIC VALVE WITH ADAPTER.: Brand: SURSEAL® II

## (undated) DEVICE — STERILE WATER 1000ML BTL

## (undated) DEVICE — SOLO FLEX HYBRID GUIDEWIRE .03

## (undated) DEVICE — STERILE SURGICAL LUBRICANT, METAL TUBE: Brand: SURGILUBE

## (undated) DEVICE — 3M™ STERI-STRIP™ REINFORCED ADHESIVE SKIN CLOSURES, R1547, 1/2 IN X 4 IN (12 MM X 100 MM), 6 STRIPS/ENVELOPE: Brand: 3M™ STERI-STRIP™

## (undated) DEVICE — SNAP KOVER: Brand: UNBRANDED

## (undated) DEVICE — UROLOGY DRAIN BAG

## (undated) DEVICE — Device: Brand: DEFENDO AIR/WATER/SUCTION AND BIOPSY VALVE

## (undated) NOTE — LETTER
5/11/2019              Linnea Baker        [address] [place] [address]        [address] [address] [address]         Dear Davie Walls records indicate that the tests ordered for you by Aileen Sabillon MD  have not been done.   If you have, in fact, already completed the tests or

## (undated) NOTE — LETTER
Date & Time: 3/17/2023, 7:32 PM  Patient: Gilles Chicas  Encounter Provider(s):    Vaughn Maciel MD       To Whom It May Concern:    Gilles Chicas was seen and treated in our department on 3/17/2023. He can return to work.     If you have any questions or concerns, please do not hesitate to call.        _____________________________  Physician/APC Signature

## (undated) NOTE — LETTER
8/21/2019          To Whom It May Concern:    Shamar Umana is currently under my medical care. Please excuse the patient from work missed today as he had a doctor's appointment today. If you require additional information please contact our office.

## (undated) NOTE — LETTER
To Whom It May Concern:    Aliza Alva is currently under my medical care and may not return to work at this time. Please excuse Lucien Kearns from 3/9-3/12. He may return to work on 3/13. Based on his pain level he may need to be on light duty, otherwise I have no restrictions. Activity is restricted as follows: light duty, no lifting over 20 lbs. and no prolonged standing. If you require additional information please contact our office.     Sincerely,    Ryland Harris MD   [facility], [address]  [phone]        Document generated by:  Ryland Harris MD

## (undated) NOTE — LETTER
9/25/2019          To Whom It May Concern:    Luca Sotelo is currently under my medical care. Please excuse the patient from work missed on 9/25/19 as he had a doctor's appointment. If you require additional information please contact our office.

## (undated) NOTE — LETTER
Date & Time: 1/24/2023, 9:32 AM  Patient: June Rodriguez  Encounter Provider(s):    MD Blanca Coleman, MD Sigifredo Mcallister MD       To Whom It May Concern:    June Rodriguez was seen and treated in our department on 1/23/2023. He may return to work on 1/25/2023.   If you have any questions or concerns, please do not hesitate to call.        _____________________________  Physician/APC Signature

## (undated) NOTE — LETTER
9/25/2019          To Whom It May Concern:    Ry Lloyd is currently under my medical care. Please excuse the patient from work missed on 9/25/19 as he had a doctor's appointment. If you require additional information please contact our office.

## (undated) NOTE — LETTER
4/2/2022          To Whom It May Concern:    Trip Rosen is currently under my medical care. Please excuse the patient from work missed  On 4/1/22 as the patient has been ill. May return to work when well and recovered. If you require additional information please contact our office.         Sincerely,      Link Ervin MD          Document generated by:  Link Ervin MD

## (undated) NOTE — Clinical Note
OLAMIDE Freire, this is a patient of yours from 2006. His symptoms are very similar to what he was evaluated him for in 2006. He does not appear to have followed up with a GI provider since that time.   Please feel free to review my office note and let me k

## (undated) NOTE — LETTER
4/20/2021          To Whom It May Concern:    Socorro Torres is currently under my medical care. Please excuse the patient from work missed as the patient has been medical issue  May return to work when well and sufficiently recovered.     If you require trupti

## (undated) NOTE — LETTER
[address], [place]  Authorization for Invasive Procedures  1.  I hereby authorize Dr. Jt Vega*** , my physician and whomever may be designated as the doctor's assistant, to perform the following operation and/or procedure:  *MAT performed for the purposes of advancing medicine, science, and/or education, provided my identity is not revealed. If the procedure has been videotaped, the physician/surgeon will obtain the original videotape.  The hospital will not be responsible for stor My signature below affirms that prior to the time of the procedure, I have explained to the patient and/or his legal representative, the risks and benefits involved in the proposed treatment and any reasonable alternative to the proposed treatment.  I have

## (undated) NOTE — ED AVS SNAPSHOT
Tao Lee Ann   MRN: [id number]    Department:  [facility] Emergency Department   Date of Visit:  2/10/2018           Disclosure     Insurance plans vary and the physician(s) referred by the ER may not be covered by your plan.  Please contact your CARE PHYSICIAN AT ONCE OR RETURN IMMEDIATELY TO THE EMERGENCY DEPARTMENT. If you have been prescribed any medication(s), please fill your prescription right away and begin taking the medication(s) as directed.   If you believe that any of the medications

## (undated) NOTE — LETTER
1/29/2019              Sami Tidwell        [address] [place] [address]        [address] [address]         Dear Maria Eugenia Faulkner,      It was a pleasure to see you at our [address] office.   Your lab tests were normal.  There is no need for further

## (undated) NOTE — LETTER
[facility]  Administration of Anesthesia  1. Kee Hernandez, or _________________________________ acting on his behalf, (Patient) (Dependent/Representative) request to receive anesthesia for my pending procedure/operation/treatment.   A emma infections, high spinal block, spinal bleeding, seizure, cardiac arrest and death. 7. AWARENESS: I understand that it is possible (but unlikely) to have explicit memory of events from the operating room while under general anesthesia.   8. ELECTROCONVULSIV unconscious pt /Relationship    My signature below affirms that prior to the time of the procedure, I have explained to the patient and/or his/her guardian, the risks and benefits of undergoing anesthesia, as well as any reasonable alternatives.     _______

## (undated) NOTE — LETTER
[address]  Authorization for Surgical Operation and Procedure                                                                                           1. I hereby authorize Praveen Grande MD, my physician and his/her assistants (if applicable), which may include medical students, residents, and/or fellows, to perform the following surgical operation/ procedure and administer such anesthesia as may be determined necessary by my physician: Operation/Procedure name (s) Cystoscopy, right retrograde pyelogram, right ureteral stent placement, possible right ureteroscopy, laser lithotripsy, stone extraction on Lois Daily   2. I recognize that during the surgical operation/procedure, unforeseen conditions may necessitate additional or different procedures than those listed above. I, therefore, further authorize and request that the above-named surgeon, assistants, or designees perform such procedures as are, in their judgment, necessary and desirable. 3.   My surgeon/physician has discussed prior to my surgery the potential benefits, risks and side effects of this procedure; the likelihood of achieving goals; and potential problems that might occur during recuperation. They also discussed reasonable alternatives to the procedure, including risks, benefits, and side effects related to the alternatives and risks related to not receiving this procedure. I have had all my questions answered and I acknowledge that no guarantee has been made as to the result that may be obtained. 4.   Should the need arise during my operation/procedure, which includes change of level of care prior to discharge, I also consent to the administration of blood and/or blood products.   Further, I understand that despite careful testing and screening of blood or blood products by collecting agencies, I may still be subject to ill effects as a result of receiving a blood transfusion and/or blood products. The following are some, but not all, of the potential risks that can occur: fever and allergic reactions, hemolytic reactions, transmission of diseases such as Hepatitis, AIDS and Cytomegalovirus (CMV) and fluid overload. In the event that I wish to have an autologous transfusion of my own blood, or a directed donor transfusion, I will discuss this with my physician. Check only if Refusing Blood or Blood Products  I understand refusal of blood or blood products as deemed necessary by my physician may have serious consequences to my condition to include possible death. I hereby assume responsibility for my refusal and release the hospital, its personnel, and my physicians from any responsibility for the consequences of my refusal.    o  Refuse   5. I authorize the use of any specimen, organs, tissues, body parts or foreign objects that may be removed from my body during the operation/procedure for diagnosis, research or teaching purposes and their subsequent disposal by hospital authorities. I also authorize the release of specimen test results and/or written reports to my treating physician on the hospital medical staff or other referring or consulting physicians involved in my care, at the discretion of the Pathologist or my treating physician. 6.   I consent to the photographing or videotaping of the operations or procedures to be performed, including appropriate portions of my body for medical, scientific, or educational purposes, provided my identity is not revealed by the pictures or by descriptive texts accompanying them. If the procedure has been photographed/videotaped, the surgeon will obtain the original picture, image, videotape or CD.   The hospital will not be responsible for storage, release or maintenance of the picture, image, tape or CD.    7.   I consent to the presence of a  or observers in the operating room as deemed necessary by my physician or their designees. 8.   I recognize that in the event my procedure results in extended X-Ray/fluoroscopy time, I may develop a skin reaction. 9. If I have a Do Not Attempt Resuscitation (DNAR) order in place, that status will be suspended while in the operating room, procedural suite, and during the recovery period unless otherwise explicitly stated by me (or a person authorized to consent on my behalf). The surgeon or my attending physician will determine when the applicable recovery period ends for purposes of reinstating the DNAR order. 10. Patients having a sterilization procedure: I understand that if the procedure is successful the results will be permanent and it will therefore be impossible for me to inseminate, conceive, or bear children. I also understand that the procedure is intended to result in sterility, although the result has not been guaranteed. 11. I acknowledge that my physician has explained sedation/analgesia administration to me including the risk and benefits I consent to the administration of sedation/analgesia as may be necessary or desirable in the judgment of my physician.     I CERTIFY THAT I HAVE READ AND FULLY UNDERSTAND THE ABOVE CONSENT TO OPERATION and/or OTHER PROCEDURE.     _________________________________________ _________________________________     ___________________________________  Signature of Patient     Signature of Responsible Person                   Printed Name of Responsible Person                              _________________________________________ ______________________________        ___________________________________  Signature of Witness         Date  Time         Relationship to Patient    STATEMENT OF PHYSICIAN My signature below affirms that prior to the time of the procedure; I have explained to the patient and/or his/her legal representative, the risks and benefits involved in the proposed treatment and any reasonable alternative to the proposed treatment. I have also explained the risks and benefits involved in refusal of the proposed treatment and alternatives to the proposed treatment and have answered the patient's questions.  If I have a significant financial interest in a co-management agreement or a significant financial interest in any product or implant, or other significant relationship used in this procedure/surgery, I have disclosed this and had a discussion with my patient.     _______________________________________________________________ _____________________________  Th[facility] of Physician)                                                                                         (Date)                                   (Time)  Patient Name: Beatriz Saini    :    Printed: 2023      Medical Record #: [id number]                                              Page 1 of 1

## (undated) NOTE — LETTER
8/15/2023          To Whom It May Concern:    Fabrizio Blevins is currently under my medical care. Please excuse the patient from work missed until further notice. He has an appointment on 8/17/23 for evaluation to return to work. If you require additional information please contact our office.         Sincerely,      Yaritza Ward MD          Document generated by:  Yaritza Ward MD

## (undated) NOTE — LETTER
Date & Time: 3/17/2023, 3:22 PM  Patient: Willow Rider  Encounter Provider(s):    Kishor Cespedes MD       To Whom It May Concern:    Willow Rider was seen and treated in our department on 3/17/2023.     If you have any questions or concerns, please do not hesitate to call.        _____________________________  Physician/APC Signature

## (undated) NOTE — LETTER
8/21/2019          To Whom It May Concern:    Juliette Abbasi is currently under my medical care. Please be advised that the patient has medical conditions that are being treated with medications recently.   If you require additional information please contact

## (undated) NOTE — LETTER
2/12/2020              Shelby Arriaza        [address] [place] [address]        [address] [address]         Dear Opal Voss,    This letter is to inform you that our office has made several attempts to reach you by phone without success.   We were attempting to contact you by cooper